# Patient Record
Sex: MALE | Race: WHITE | NOT HISPANIC OR LATINO | Employment: OTHER | URBAN - METROPOLITAN AREA
[De-identification: names, ages, dates, MRNs, and addresses within clinical notes are randomized per-mention and may not be internally consistent; named-entity substitution may affect disease eponyms.]

---

## 2020-07-24 ENCOUNTER — TRANSCRIBE ORDERS (OUTPATIENT)
Dept: ADMINISTRATIVE | Facility: HOSPITAL | Age: 65
End: 2020-07-24

## 2020-07-24 DIAGNOSIS — L03.115 CELLULITIS OF RIGHT FOOT: Primary | ICD-10-CM

## 2020-07-25 ENCOUNTER — HOSPITAL ENCOUNTER (INPATIENT)
Facility: HOSPITAL | Age: 65
LOS: 2 days | Discharge: HOME | DRG: 603 | End: 2020-07-27
Attending: EMERGENCY MEDICINE | Admitting: FAMILY MEDICINE
Payer: COMMERCIAL

## 2020-07-25 ENCOUNTER — APPOINTMENT (EMERGENCY)
Dept: MRI IMAGING | Facility: HOSPITAL | Age: 65
DRG: 603 | End: 2020-07-25
Payer: COMMERCIAL

## 2020-07-25 DIAGNOSIS — L03.115 CELLULITIS OF RIGHT FOOT: Primary | ICD-10-CM

## 2020-07-25 PROBLEM — N17.9 AKI (ACUTE KIDNEY INJURY) (HCC): Status: ACTIVE | Noted: 2020-07-25

## 2020-07-25 LAB
ALBUMIN SERPL BCP-MCNC: 3.5 G/DL (ref 3.5–5)
ALP SERPL-CCNC: 91 U/L (ref 46–116)
ALT SERPL W P-5'-P-CCNC: 42 U/L (ref 12–78)
ANION GAP SERPL CALCULATED.3IONS-SCNC: 8 MMOL/L (ref 4–13)
AST SERPL W P-5'-P-CCNC: 20 U/L (ref 5–45)
BASOPHILS # BLD AUTO: 0.1 THOUSANDS/ΜL (ref 0–0.1)
BASOPHILS NFR BLD AUTO: 1 % (ref 0–1)
BILIRUB SERPL-MCNC: 0.35 MG/DL (ref 0.2–1)
BUN SERPL-MCNC: 17 MG/DL (ref 5–25)
CALCIUM SERPL-MCNC: 8.6 MG/DL (ref 8.3–10.1)
CHLORIDE SERPL-SCNC: 104 MMOL/L (ref 100–108)
CK MB SERPL-MCNC: 2.1 % (ref 0–2.5)
CK MB SERPL-MCNC: 3.6 NG/ML (ref 0–5)
CK SERPL-CCNC: 170 U/L (ref 39–308)
CO2 SERPL-SCNC: 27 MMOL/L (ref 21–32)
CREAT SERPL-MCNC: 1.39 MG/DL (ref 0.6–1.3)
EOSINOPHIL # BLD AUTO: 0.26 THOUSAND/ΜL (ref 0–0.61)
EOSINOPHIL NFR BLD AUTO: 3 % (ref 0–6)
ERYTHROCYTE [DISTWIDTH] IN BLOOD BY AUTOMATED COUNT: 12.8 % (ref 11.6–15.1)
GFR SERPL CREATININE-BSD FRML MDRD: 52 ML/MIN/1.73SQ M
GLUCOSE SERPL-MCNC: 145 MG/DL (ref 65–140)
HCT VFR BLD AUTO: 48.4 % (ref 36.5–49.3)
HGB BLD-MCNC: 16.1 G/DL (ref 12–17)
IMM GRANULOCYTES # BLD AUTO: 0.02 THOUSAND/UL (ref 0–0.2)
IMM GRANULOCYTES NFR BLD AUTO: 0 % (ref 0–2)
LACTATE SERPL-SCNC: 1.3 MMOL/L (ref 0.5–2)
LYMPHOCYTES # BLD AUTO: 1.09 THOUSANDS/ΜL (ref 0.6–4.47)
LYMPHOCYTES NFR BLD AUTO: 14 % (ref 14–44)
MCH RBC QN AUTO: 30 PG (ref 26.8–34.3)
MCHC RBC AUTO-ENTMCNC: 33.3 G/DL (ref 31.4–37.4)
MCV RBC AUTO: 90 FL (ref 82–98)
MONOCYTES # BLD AUTO: 0.47 THOUSAND/ΜL (ref 0.17–1.22)
MONOCYTES NFR BLD AUTO: 6 % (ref 4–12)
NEUTROPHILS # BLD AUTO: 5.91 THOUSANDS/ΜL (ref 1.85–7.62)
NEUTS SEG NFR BLD AUTO: 76 % (ref 43–75)
NRBC BLD AUTO-RTO: 0 /100 WBCS
PLATELET # BLD AUTO: 248 THOUSANDS/UL (ref 149–390)
PMV BLD AUTO: 9.5 FL (ref 8.9–12.7)
POTASSIUM SERPL-SCNC: 3.8 MMOL/L (ref 3.5–5.3)
PROT SERPL-MCNC: 6.7 G/DL (ref 6.4–8.2)
RBC # BLD AUTO: 5.37 MILLION/UL (ref 3.88–5.62)
SODIUM SERPL-SCNC: 139 MMOL/L (ref 136–145)
WBC # BLD AUTO: 7.85 THOUSAND/UL (ref 4.31–10.16)

## 2020-07-25 PROCEDURE — 86617 LYME DISEASE ANTIBODY: CPT | Performed by: EMERGENCY MEDICINE

## 2020-07-25 PROCEDURE — 73720 MRI LWR EXTREMITY W/O&W/DYE: CPT

## 2020-07-25 PROCEDURE — 36415 COLL VENOUS BLD VENIPUNCTURE: CPT | Performed by: EMERGENCY MEDICINE

## 2020-07-25 PROCEDURE — 83605 ASSAY OF LACTIC ACID: CPT | Performed by: EMERGENCY MEDICINE

## 2020-07-25 PROCEDURE — 85025 COMPLETE CBC W/AUTO DIFF WBC: CPT | Performed by: EMERGENCY MEDICINE

## 2020-07-25 PROCEDURE — 99285 EMERGENCY DEPT VISIT HI MDM: CPT | Performed by: EMERGENCY MEDICINE

## 2020-07-25 PROCEDURE — 82553 CREATINE MB FRACTION: CPT | Performed by: FAMILY MEDICINE

## 2020-07-25 PROCEDURE — 86618 LYME DISEASE ANTIBODY: CPT | Performed by: EMERGENCY MEDICINE

## 2020-07-25 PROCEDURE — A9585 GADOBUTROL INJECTION: HCPCS | Performed by: EMERGENCY MEDICINE

## 2020-07-25 PROCEDURE — 82550 ASSAY OF CK (CPK): CPT | Performed by: FAMILY MEDICINE

## 2020-07-25 PROCEDURE — 87040 BLOOD CULTURE FOR BACTERIA: CPT | Performed by: EMERGENCY MEDICINE

## 2020-07-25 PROCEDURE — 99223 1ST HOSP IP/OBS HIGH 75: CPT | Performed by: FAMILY MEDICINE

## 2020-07-25 PROCEDURE — 99285 EMERGENCY DEPT VISIT HI MDM: CPT

## 2020-07-25 PROCEDURE — 80053 COMPREHEN METABOLIC PANEL: CPT | Performed by: EMERGENCY MEDICINE

## 2020-07-25 PROCEDURE — 96360 HYDRATION IV INFUSION INIT: CPT

## 2020-07-25 RX ORDER — ACETAMINOPHEN 325 MG/1
650 TABLET ORAL EVERY 6 HOURS PRN
Status: DISCONTINUED | OUTPATIENT
Start: 2020-07-25 | End: 2020-07-27 | Stop reason: HOSPADM

## 2020-07-25 RX ORDER — SODIUM CHLORIDE 9 MG/ML
75 INJECTION, SOLUTION INTRAVENOUS CONTINUOUS
Status: DISCONTINUED | OUTPATIENT
Start: 2020-07-25 | End: 2020-07-27 | Stop reason: HOSPADM

## 2020-07-25 RX ORDER — ONDANSETRON 2 MG/ML
4 INJECTION INTRAMUSCULAR; INTRAVENOUS EVERY 6 HOURS PRN
Status: DISCONTINUED | OUTPATIENT
Start: 2020-07-25 | End: 2020-07-27 | Stop reason: HOSPADM

## 2020-07-25 RX ORDER — CEPHALEXIN 500 MG/1
500 CAPSULE ORAL EVERY 6 HOURS SCHEDULED
COMMUNITY
End: 2020-07-25

## 2020-07-25 RX ORDER — PRAVASTATIN SODIUM 40 MG
40 TABLET ORAL
Status: DISCONTINUED | OUTPATIENT
Start: 2020-07-25 | End: 2020-07-27 | Stop reason: HOSPADM

## 2020-07-25 RX ORDER — OXYCODONE HYDROCHLORIDE AND ACETAMINOPHEN 5; 325 MG/1; MG/1
1 TABLET ORAL EVERY 6 HOURS PRN
Status: DISCONTINUED | OUTPATIENT
Start: 2020-07-25 | End: 2020-07-27 | Stop reason: HOSPADM

## 2020-07-25 RX ORDER — SIMVASTATIN 40 MG
40 TABLET ORAL DAILY
COMMUNITY
Start: 2010-07-12

## 2020-07-25 RX ORDER — SULFAMETHOXAZOLE AND TRIMETHOPRIM 800; 160 MG/1; MG/1
1 TABLET ORAL EVERY 12 HOURS SCHEDULED
COMMUNITY
End: 2020-07-25

## 2020-07-25 RX ORDER — CEFTRIAXONE 1 G/50ML
1000 INJECTION, SOLUTION INTRAVENOUS EVERY 24 HOURS
Status: DISCONTINUED | OUTPATIENT
Start: 2020-07-26 | End: 2020-07-26

## 2020-07-25 RX ORDER — MAGNESIUM HYDROXIDE/ALUMINUM HYDROXICE/SIMETHICONE 120; 1200; 1200 MG/30ML; MG/30ML; MG/30ML
30 SUSPENSION ORAL EVERY 6 HOURS PRN
Status: DISCONTINUED | OUTPATIENT
Start: 2020-07-25 | End: 2020-07-27 | Stop reason: HOSPADM

## 2020-07-25 RX ORDER — VANCOMYCIN HYDROCHLORIDE 1 G/200ML
12.5 INJECTION, SOLUTION INTRAVENOUS EVERY 12 HOURS
Status: DISCONTINUED | OUTPATIENT
Start: 2020-07-26 | End: 2020-07-26 | Stop reason: DRUGHIGH

## 2020-07-25 RX ORDER — DOCUSATE SODIUM 100 MG/1
100 CAPSULE, LIQUID FILLED ORAL 2 TIMES DAILY
Status: DISCONTINUED | OUTPATIENT
Start: 2020-07-25 | End: 2020-07-27 | Stop reason: HOSPADM

## 2020-07-25 RX ORDER — CEFTRIAXONE 1 G/50ML
1000 INJECTION, SOLUTION INTRAVENOUS ONCE
Status: COMPLETED | OUTPATIENT
Start: 2020-07-25 | End: 2020-07-25

## 2020-07-25 RX ADMIN — SODIUM CHLORIDE 75 ML/HR: 0.9 INJECTION, SOLUTION INTRAVENOUS at 14:00

## 2020-07-25 RX ADMIN — PRAVASTATIN SODIUM 40 MG: 40 TABLET ORAL at 16:11

## 2020-07-25 RX ADMIN — VANCOMYCIN HYDROCHLORIDE 1500 MG: 1 INJECTION, POWDER, LYOPHILIZED, FOR SOLUTION INTRAVENOUS at 12:54

## 2020-07-25 RX ADMIN — ENOXAPARIN SODIUM 40 MG: 40 INJECTION SUBCUTANEOUS at 13:59

## 2020-07-25 RX ADMIN — GADOBUTROL 10 ML: 604.72 INJECTION INTRAVENOUS at 11:26

## 2020-07-25 RX ADMIN — CEFTRIAXONE 1000 MG: 1 INJECTION, SOLUTION INTRAVENOUS at 12:38

## 2020-07-25 RX ADMIN — SODIUM CHLORIDE 1000 ML: 0.9 INJECTION, SOLUTION INTRAVENOUS at 10:22

## 2020-07-25 NOTE — ED PROVIDER NOTES
History  Chief Complaint   Patient presents with    Leg Pain     pt with red and warm foot that travels to the knee  right leg Pt is scheduled for MRI today at 1000  Patient has been having redness and swelling to the right foot for the past 3 weeks  Has been on 3 different antibiotics including doxycycline and Bactrim and now currently Keflex  Scheduled for an MRI today  Complains of increasing redness and swelling  No fevers or chills  No nausea vomiting or diarrhea  Now with redness to the knee and under the right chest region  History provided by:  Patient   used: No    Medical Problem   Location:  Right foot swelling  Quality:  Achy  Severity:  Mild  Onset quality:  Gradual  Duration:  3 weeks  Timing:  Constant  Progression:  Worsening  Chronicity:  New  Context:  Right foot swelling  Relieved by:  Nothing  Worsened by:  Nothing  Ineffective treatments:  Antibiotics  Associated symptoms: myalgias    Associated symptoms: no abdominal pain, no chest pain, no congestion, no cough, no diarrhea, no ear pain, no fever, no headaches, no nausea, no rash, no rhinorrhea, no shortness of breath, no sore throat, no vomiting and no wheezing        Prior to Admission Medications   Prescriptions Last Dose Informant Patient Reported? Taking?   simvastatin (ZOCOR) 40 mg tablet 7/24/2020 at Unknown time  Yes Yes   Sig: Take 40 mg by mouth daily      Facility-Administered Medications: None       Past Medical History:   Diagnosis Date    High cholesterol     Leg pain        History reviewed  No pertinent surgical history  No family history on file  I have reviewed and agree with the history as documented  E-Cigarette/Vaping     E-Cigarette/Vaping Substances     Social History     Tobacco Use    Smoking status: Former Smoker   Substance Use Topics    Alcohol use: Not Currently    Drug use: Not on file       Review of Systems   Constitutional: Negative for chills and fever     HENT: Negative for congestion, ear pain, hearing loss, rhinorrhea, sore throat, trouble swallowing and voice change  Eyes: Negative for pain and discharge  Respiratory: Negative for cough, shortness of breath and wheezing  Cardiovascular: Negative for chest pain and palpitations  Gastrointestinal: Negative for abdominal pain, blood in stool, constipation, diarrhea, nausea and vomiting  Genitourinary: Negative for dysuria, flank pain, frequency and hematuria  Musculoskeletal: Positive for arthralgias and myalgias  Negative for joint swelling, neck pain and neck stiffness  Skin: Negative for rash and wound  Neurological: Negative for dizziness, seizures, syncope, facial asymmetry and headaches  Psychiatric/Behavioral: Negative for hallucinations, self-injury and suicidal ideas  All other systems reviewed and are negative  Physical Exam  Physical Exam   Constitutional: He is oriented to person, place, and time  He appears well-developed and well-nourished  No distress  HENT:   Head: Normocephalic and atraumatic  Right Ear: External ear normal    Left Ear: External ear normal    Eyes: Pupils are equal, round, and reactive to light  Conjunctivae and EOM are normal    Neck: Normal range of motion  Neck supple  Cardiovascular: Normal rate, regular rhythm and normal heart sounds  No murmur heard  Pulmonary/Chest: Effort normal and breath sounds normal  He has no wheezes  He has no rales  Abdominal: Soft  Bowel sounds are normal  He exhibits no distension  There is no tenderness  There is no rebound and no guarding  Musculoskeletal: Normal range of motion  He exhibits no deformity  Right foot with diffuse erythema and warmth  No definite fluctuance  Dorsalis pedis pulses 2+  There is redness just above the right knee and under the right chest region  Neurological: He is alert and oriented to person, place, and time  No cranial nerve deficit  Skin: Skin is warm and dry   No rash noted    Psychiatric: He has a normal mood and affect  His behavior is normal    Nursing note and vitals reviewed        Vital Signs  ED Triage Vitals [07/25/20 0945]   Temperature Pulse Respirations Blood Pressure SpO2   (!) 97 4 °F (36 3 °C) 80 16 159/84 95 %      Temp Source Heart Rate Source Patient Position - Orthostatic VS BP Location FiO2 (%)   Temporal Monitor -- Left arm --      Pain Score       7           Vitals:    07/25/20 1156 07/25/20 1200 07/25/20 1230 07/25/20 1311   BP: 135/64 118/60 124/70 148/75   Pulse: 64 66 60 62         Visual Acuity      ED Medications  Medications   vancomycin (VANCOCIN) 1,500 mg in sodium chloride 0 9 % 250 mL IVPB (1,500 mg Intravenous New Bag 7/25/20 1254)   cefTRIAXone (ROCEPHIN) IVPB (premix) 1,000 mg 50 mL (has no administration in time range)   vancomycin (VANCOCIN) 1,500 mg in sodium chloride 0 9 % 250 mL IVPB (has no administration in time range)   sodium chloride 0 9 % bolus 1,000 mL (0 mL Intravenous Stopped 7/25/20 1127)   Gadobutrol injection (SINGLE-DOSE) SOLN 10 mL (10 mL Intravenous Given 7/25/20 1126)   cefTRIAXone (ROCEPHIN) IVPB (premix) 1,000 mg 50 mL (0 mg Intravenous Stopped 7/25/20 1254)       Diagnostic Studies  Results Reviewed     Procedure Component Value Units Date/Time    Comprehensive metabolic panel [[id number]]  (Abnormal) Collected:  07/25/20 1019    Lab Status:  Final result Specimen:  Blood from Arm, Right Updated:  07/25/20 1101     Sodium 139 mmol/L      Potassium 3 8 mmol/L      Chloride 104 mmol/L      CO2 27 mmol/L      ANION GAP 8 mmol/L      BUN 17 mg/dL      Creatinine 1 39 mg/dL      Glucose 145 mg/dL      Calcium 8 6 mg/dL      AST 20 U/L      ALT 42 U/L      Alkaline Phosphatase 91 U/L      Total Protein 6 7 g/dL      Albumin 3 5 g/dL      Total Bilirubin 0 35 mg/dL      eGFR 52 ml/min/1 73sq m     Narrative:       [place] guidelines for Chronic Kidney Disease (CKD):     Stage 1 with normal or high GFR (GFR > 90 mL/min/1 73 square meters)    Stage 2 Mild CKD (GFR = 60-89 mL/min/1 73 square meters)    Stage 3A Moderate CKD (GFR = 45-59 mL/min/1 73 square meters)    Stage 3B Moderate CKD (GFR = 30-44 mL/min/1 73 square meters)    Stage 4 Severe CKD (GFR = 15-29 mL/min/1 73 square meters)    Stage 5 End Stage CKD (GFR <15 mL/min/1 73 square meters)  Note: GFR calculation is accurate only with a steady state creatinine    Lactic acid [[id number]]  (Normal) Collected:  07/25/20 1019    Lab Status:  Final result Specimen:  Blood from Arm, Right Updated:  07/25/20 1054     LACTIC ACID 1 3 mmol/L     Narrative:       Result may be elevated if tourniquet was used during collection  CBC and differential [[id number]]  (Abnormal) Collected:  07/25/20 1019    Lab Status:  Final result Specimen:  Blood from Arm, Right Updated:  07/25/20 1030     WBC 7 85 Thousand/uL      RBC 5 37 Million/uL      Hemoglobin 16 1 g/dL      Hematocrit 48 4 %      MCV 90 fL      MCH 30 0 pg      MCHC 33 3 g/dL      RDW 12 8 %      MPV 9 5 fL      Platelets 489 Thousands/uL      nRBC 0 /100 WBCs      Neutrophils Relative 76 %      Immat GRANS % 0 %      Lymphocytes Relative 14 %      Monocytes Relative 6 %      Eosinophils Relative 3 %      Basophils Relative 1 %      Neutrophils Absolute 5 91 Thousands/µL      Immature Grans Absolute 0 02 Thousand/uL      Lymphocytes Absolute 1 09 Thousands/µL      Monocytes Absolute 0 47 Thousand/µL      Eosinophils Absolute 0 26 Thousand/µL      Basophils Absolute 0 10 Thousands/µL     Blood culture #2 [[id number]] Collected:  07/25/20 1019    Lab Status: In process Specimen:  Blood from Arm, Left Updated:  07/25/20 1030    Blood culture #1 [[id number]] Collected:  07/25/20 1019    Lab Status: In process Specimen:  Blood from Arm, Right Updated:  07/25/20 1030    Lyme Antibody Profile with reflex to [facility] [[id number]] Collected:  07/25/20 1019    Lab Status:   In process Specimen:  Blood from Arm, Right Updated: 07/25/20 1028                 MRI foot/forefoot toes right w wo contrast   Final Result by Yu García MD (07/25 1311)      Areas of dorsal and forefoot subcutaneous edema without an abscess or osteomyelitis  Mild edema of the distal tip of the first distal phalanx with an underlying area of linear signal abnormality  Please assess for tenderness to exclude an underlying nondisplaced fracture  #7/14 and #6/14  Differential diagnosis also includes mild    reactive edema, to the overlying cellulitis , overlying a chronic healed injury  The study was marked in [facility] for immediate notification  Workstation performed: [id number]                    Procedures  Procedures         ED Course       US AUDIT      Most Recent Value   Initial Alcohol Screen: US AUDIT-C    1  How often do you have a drink containing alcohol?  0 Filed at: 07/25/2020 0948   2  How many drinks containing alcohol do you have on a typical day you are drinking? 0 Filed at: 07/25/2020 0948   3a  Male UNDER 65: How often do you have five or more drinks on one occasion? 0 Filed at: 07/25/2020 0948   3b  FEMALE Any Age, or MALE 65+: How often do you have 4 or more drinks on one occassion? 0 Filed at: 07/25/2020 0948   Audit-C Score  0 Filed at: 07/25/2020 5697                  PRINCE/DAST-10      Most Recent Value   How many times in the past year have you    Used an illegal drug or used a prescription medication for non-medical reasons?   Never Filed at: 07/25/2020 6244                                MDM  Number of Diagnoses or Management Options     Amount and/or Complexity of Data Reviewed  Clinical lab tests: reviewed                          Disposition  Final diagnoses:   Cellulitis of right foot     Time reflects when diagnosis was documented in both MDM as applicable and the Disposition within this note     Time User Action Codes Description Comment    7/25/2020 12:12 PM Sanjeev MORRIS Add [L03 115] Cellulitis of right foot       ED Disposition     ED Disposition Condition Date/Time Comment    Admit Stable Sat Jul 25, 2020 12:29 PM Case was discussed with Dr Joseph Wellington and the patient's admission status was agreed to be  to the service of Dr Rosalinda Boateng         Follow-up Information    None         Current Discharge Medication List      CONTINUE these medications which have NOT CHANGED    Details   simvastatin (ZOCOR) 40 mg tablet Take 40 mg by mouth daily           No discharge procedures on file      PDMP Review       Value Time User    PDMP Reviewed  Yes 7/25/2020 12:39 PM Estella Meyers MD          ED Provider  Electronically Signed by           Joie Levine MD  07/25/20 102 [facility] Elizabeth Mayes MD  07/25/20 102 [facility] Elizabeth Mayes MD  07/25/20 7207

## 2020-07-25 NOTE — PLAN OF CARE
Problem: PAIN - ADULT  Goal: Verbalizes/displays adequate comfort level or baseline comfort level  Description  Interventions:  - Encourage patient to monitor pain and request assistance  - Assess pain using appropriate pain scale  - Administer analgesics based on type and severity of pain and evaluate response  - Implement non-pharmacological measures as appropriate and evaluate response  - Consider cultural and social influences on pain and pain management  - Notify physician/advanced practitioner if interventions unsuccessful or patient reports new pain  Outcome: Progressing     Problem: INFECTION - ADULT  Goal: Absence or prevention of progression during hospitalization  Description  INTERVENTIONS:  - Assess and monitor for signs and symptoms of infection  - Monitor lab/diagnostic results  - Monitor all insertion sites, i e  indwelling lines, tubes, and drains  - Monitor endotracheal if appropriate and nasal secretions for changes in amount and color  - [place] appropriate cooling/warming therapies per order  - Administer medications as ordered  - Instruct and encourage patient and family to use good hand hygiene technique  - Identify and instruct in appropriate isolation precautions for identified infection/condition  Outcome: Progressing  Goal: Absence of fever/infection during neutropenic period  Description  INTERVENTIONS:  - Monitor WBC    Outcome: Progressing     Problem: SAFETY ADULT  Goal: Patient will remain free of falls  Description  INTERVENTIONS:  - Assess patient frequently for physical needs  -  Identify cognitive and physical deficits and behaviors that affect risk of falls    -  [place] fall precautions as indicated by assessment   - Educate patient/family on patient safety including physical limitations  - Instruct patient to call for assistance with activity based on assessment  - Modify environment to reduce risk of injury  - Consider OT/PT consult to assist with strengthening/mobility  Outcome: Progressing  Goal: Maintain or return to baseline ADL function  Description  INTERVENTIONS:  -  Assess patient's ability to carry out ADLs; assess patient's baseline for ADL function and identify physical deficits which impact ability to perform ADLs (bathing, care of mouth/teeth, toileting, grooming, dressing, etc )  - Assess/evaluate cause of self-care deficits   - Assess range of motion  - Assess patient's mobility; develop plan if impaired  - Assess patient's need for assistive devices and provide as appropriate  - Encourage maximum independence but intervene and supervise when necessary  - Involve family in performance of ADLs  - Assess for home care needs following discharge   - Consider OT consult to assist with ADL evaluation and planning for discharge  - Provide patient education as appropriate  Outcome: Progressing  Goal: Maintain or return mobility status to optimal level  Description  INTERVENTIONS:  - Assess patient's baseline mobility status (ambulation, transfers, stairs, etc )    - Identify cognitive and physical deficits and behaviors that affect mobility  - Identify mobility aids required to assist with transfers and/or ambulation (gait belt, sit-to-stand, lift, walker, cane, etc )  - [place] fall precautions as indicated by assessment  - Record patient progress and toleration of activity level on Mobility SBAR; progress patient to next Phase/Stage  - Instruct patient to call for assistance with activity based on assessment  - Consider rehabilitation consult to assist with strengthening/weightbearing, etc   Outcome: Progressing     Problem: DISCHARGE PLANNING  Goal: Discharge to home or other facility with appropriate resources  Description  INTERVENTIONS:  - Identify barriers to discharge w/patient and caregiver  - Arrange for needed discharge resources and transportation as appropriate  - Identify discharge learning needs (meds, wound care, etc )  - Arrange for interpretive services to assist at discharge as needed  - Refer to Case Management Department for coordinating discharge planning if the patient needs post-hospital services based on physician/advanced practitioner order or complex needs related to functional status, cognitive ability, or social support system  Outcome: Progressing     Problem: Knowledge Deficit  Goal: Patient/family/caregiver demonstrates understanding of disease process, treatment plan, medications, and discharge instructions  Description  Complete learning assessment and assess knowledge base    Interventions:  - Provide teaching at level of understanding  - Provide teaching via preferred learning methods  Outcome: Progressing

## 2020-07-25 NOTE — PROGRESS NOTES
Vancomycin Assessment    Mario Simeon is a 77 y o  male who is currently receiving vancomycin 1500 mg IV every 12 hours for skin-soft tissue infection     Relevant clinical data and objective history reviewed:  Creatinine   Date Value Ref Range Status   07/25/2020 1 39 (H) 0 60 - 1 30 mg/dL Final     Comment:     Standardized to IDMS reference method     /75   Pulse 62   Temp 98 2 °F (36 8 °C)   Resp 18   Ht 5' 10" (1 778 m)   Wt 103 kg (226 lb)   SpO2 100%   BMI 32 43 kg/m²   No intake/output data recorded  Lab Results   Component Value Date/Time    BUN 17 07/25/2020 10:19 AM    WBC 7 85 07/25/2020 10:19 AM    HGB 16 1 07/25/2020 10:19 AM    HCT 48 4 07/25/2020 10:19 AM    MCV 90 07/25/2020 10:19 AM     07/25/2020 10:19 AM     Temp Readings from Last 3 Encounters:   07/25/20 98 2 °F (36 8 °C)     Vancomycin Days of Therapy: 1    Assessment/Plan  The patient is currently on vancomycin utilizing scheduled dosing based on adjusted body weight (due to obesity)  The patient is currently receiving 1500 mg IV every 12 hours and after clinical evaluation will be changed to 1000 mg IV every 12 hours  Pharmacy will also follow closely for s/sx of nephrotoxicity, infusion reactions and appropriateness of therapy  BMP and CBC will be ordered per protocol  Plan for trough as patient approaches steady state, prior to the 5th  dose at approximately 1130 on 7/27  Due to infection severity, will target a trough of 15-20 (appropriate for most indications)   Pharmacy will continue to follow the patients culture results and clinical progress daily      Todd Bassett, Pharmacist

## 2020-07-25 NOTE — H&P
H&P- Manolo Boateng 1955, 77 y o  male MRN: [id number]    Unit/Bed#: -01 Encounter: [id number]    Primary Care Provider: James Thompson MD   Date and time admitted to hospital: 7/25/2020  9:40 AM        * Cellulitis of right foot  Assessment & Plan  Outpatient treatment failure  Recently evaluated by surgical team  Hemodynamically stable  No signs or symptoms of sirs or sepsis  Patient received IV ceftriaxone and vancomycin in the ER-will continue  Lactic acid is in normal range  Patient also received IV fluid  Continue IV antibiotic  Continue to monitor  Follow , blood culture  MRI:Areas of dorsal and forefoot subcutaneous edema without an abscess or osteomyelitis  Mild edema of the distal tip of the first distal phalanx with an underlying area of linear signal abnormality  Please assess for tenderness to exclude an underlying nondisplaced fracture  #7/14 and #6/14  Differential diagnosis also includes mild   reactive edema, to the overlying cellulitis , overlying a chronic healed injury  LATA (acute kidney injury) ([facility] [address] )  Assessment & Plan  Baseline creatinine is 1 00  Patient recent creatinine is 1 39  Patient received IV fluid  Monitor labs  Continue gentle hydration        VTE Prophylaxis: Enoxaparin (Lovenox)  / sequential compression device   Code Status:  Full code  POLST: POLST is not applicable to this patient  Discussion with family:  Yes wife on the bedside    Anticipated Length of Stay:  Patient will be admitted on an Inpatient basis with an anticipated length of stay of  > 2 midnights  Justification for Hospital Stay:  For above condition    Total Time for Visit, including Counseling / Coordination of Care: 30 minutes  Greater than 50% of this total time spent on direct patient counseling and coordination of care      Chief Complaint:   Pain and swelling of the foot    History of Present Illness:    Manolo Boateng is a 77 y o  male who presents with pain and swelling of the foot, since going on July 4th started suddenly  Patient already seen by and treated with antibiotic twice outpatient basis  Patient reports the redness and swelling is getting worse, and spreading  Patient also reports other than right foot, he also noticed the rash on the right knee, inner aspect the left thigh, right lower extremity close to calf muscle and right upper abdomen  Denies any fever at this time but initially he was having fever  Denies any known insect bite  Any allergic reaction  Denies any diarrhea, joint swelling muscle       Review of Systems:    Review of Systems   Constitutional: Positive for activity change  Negative for appetite change, chills, diaphoresis, fatigue, fever and unexpected weight change  HENT: Negative for congestion, dental problem and drooling  Eyes: Negative for photophobia, redness and visual disturbance  Respiratory: Negative for apnea, choking, chest tightness, shortness of breath and stridor  Cardiovascular: Negative for chest pain, palpitations and leg swelling  Gastrointestinal: Negative for abdominal distention, abdominal pain, anal bleeding, blood in stool, constipation, diarrhea and nausea  Genitourinary: Negative for difficulty urinating, dysuria, enuresis and flank pain  Musculoskeletal: Positive for joint swelling  Negative for arthralgias, back pain and neck stiffness  Skin: Positive for color change, rash and wound  Neurological: Negative for dizziness, seizures, facial asymmetry, light-headedness, numbness and headaches  Hematological: Negative for adenopathy  Psychiatric/Behavioral: Negative for agitation, behavioral problems and confusion  All other systems reviewed and are negative  Past Medical and Surgical History:     Past Medical History:   Diagnosis Date    High cholesterol     Leg pain        History reviewed  No pertinent surgical history      Meds/Allergies:    Prior to Admission medications    Medication Sig Start Date End Date Taking? Authorizing Provider   cephalexin (KEFLEX) 500 mg capsule Take 500 mg by mouth every 6 (six) hours   Yes Historical Provider, MD   simvastatin (ZOCOR) 40 mg tablet Take 40 mg by mouth daily 7/12/10  Yes Historical Provider, MD   sulfamethoxazole-trimethoprim (BACTRIM DS) 800-160 mg per tablet Take 1 tablet by mouth every 12 (twelve) hours   Yes Historical Provider, MD     I have reviewed home medications with patient personally  Allergies: No Known Allergies    Social History:     Marital Status: /Civil Union   Occupation:  Unknown  Patient Pre-hospital Living Situation:  At home  Patient Pre-hospital Level of Mobility:  Independent  Patient Pre-hospital Diet Restrictions:  No restriction  Substance Use History:   Social History     Substance and Sexual Activity   Alcohol Use Not Currently     Social History     Tobacco Use   Smoking Status Former Smoker     Social History     Substance and Sexual Activity   Drug Use Not on file       Family History:    non-contributory    Physical Exam:     Vitals:   Blood Pressure: 148/75 (07/25/20 1311)  Pulse: 62 (07/25/20 1311)  Temperature: 98 2 °F (36 8 °C) (07/25/20 1311)  Temp Source: Temporal (07/25/20 0945)  Respirations: 18 (07/25/20 1156)  Height: 5' 10" (177 8 cm) (07/25/20 0945)  Weight - Scale: 103 kg (226 lb) (07/25/20 1127)  SpO2: 100 % (07/25/20 1311)    Physical Exam   Constitutional: He is oriented to person, place, and time  He appears well-nourished  No distress  HENT:   Mouth/Throat: Oropharynx is clear and moist  No oropharyngeal exudate  Eyes: Pupils are equal, round, and reactive to light  Conjunctivae and EOM are normal  No scleral icterus  Neck: Normal range of motion  Neck supple  No JVD present  Cardiovascular: Normal rate  Exam reveals no gallop and no friction rub  No murmur heard  Pulmonary/Chest: Effort normal and breath sounds normal  No stridor  No respiratory distress  He has no wheezes  Abdominal: Soft  Bowel sounds are normal  He exhibits no distension  There is no tenderness  There is no guarding  Musculoskeletal: Normal range of motion  He exhibits edema (right foot)  Arms:       Legs:  Neurological: He is alert and oriented to person, place, and time  He displays normal reflexes  No cranial nerve deficit  He exhibits normal muscle tone  Skin: Skin is warm  Capillary refill takes less than 2 seconds  Nursing note and vitals reviewed  Additional Data:     Lab Results: I have personally reviewed pertinent reports  Results from last 7 days   Lab Units 07/25/20  1019   WBC Thousand/uL 7 85   HEMOGLOBIN g/dL 16 1   HEMATOCRIT % 48 4   PLATELETS Thousands/uL 248   NEUTROS PCT % 76*   LYMPHS PCT % 14   MONOS PCT % 6   EOS PCT % 3     Results from last 7 days   Lab Units 07/25/20  1019   SODIUM mmol/L 139   POTASSIUM mmol/L 3 8   CHLORIDE mmol/L 104   CO2 mmol/L 27   BUN mg/dL 17   CREATININE mg/dL 1 39*   ANION GAP mmol/L 8   CALCIUM mg/dL 8 6   ALBUMIN g/dL 3 5   TOTAL BILIRUBIN mg/dL 0 35   ALK PHOS U/L 91   ALT U/L 42   AST U/L 20   GLUCOSE RANDOM mg/dL 145*                 Results from last 7 days   Lab Units 07/25/20  1019   LACTIC ACID mmol/L 1 3       Imaging: I have personally reviewed pertinent reports  MRI foot/forefoot toes right w wo contrast   Final Result by Izabella Nice MD (07/25 1311)      Areas of dorsal and forefoot subcutaneous edema without an abscess or osteomyelitis  Mild edema of the distal tip of the first distal phalanx with an underlying area of linear signal abnormality  Please assess for tenderness to exclude an underlying nondisplaced fracture  #7/14 and #6/14  Differential diagnosis also includes mild    reactive edema, to the overlying cellulitis , overlying a chronic healed injury  The study was marked in [facility] for immediate notification           Workstation performed: [id number]             EKG, Pathology, and Other Studies Reviewed on Admission:   · EKG: none    Allscripts / Epic Records Reviewed: Yes     ** Please Note: This note has been constructed using a voice recognition system   **

## 2020-07-25 NOTE — ED NOTES
Pt returns from MRI  Pt resting in position of comfort, no distress noted at this time  Awaiting MRI results        Shawnee Segundo RN  07/25/20 0617

## 2020-07-25 NOTE — ASSESSMENT & PLAN NOTE
Baseline creatinine is 1 00  Patient recent creatinine is 1 39  Patient received IV fluid  Monitor labs  Continue gentle hydration

## 2020-07-25 NOTE — ASSESSMENT & PLAN NOTE
Outpatient treatment failure  Recently evaluated by surgical team  Hemodynamically stable  No signs or symptoms of sirs or sepsis  Patient received IV ceftriaxone and vancomycin in the ER-will continue  Lactic acid is in normal range  Patient also received IV fluid  Continue IV antibiotic  Continue to monitor  Follow , blood culture  MRI:Areas of dorsal and forefoot subcutaneous edema without an abscess or osteomyelitis  Mild edema of the distal tip of the first distal phalanx with an underlying area of linear signal abnormality  Please assess for tenderness to exclude an underlying nondisplaced fracture  #7/14 and #6/14  Differential diagnosis also includes mild   reactive edema, to the overlying cellulitis , overlying a chronic healed injury

## 2020-07-26 LAB
ALBUMIN SERPL BCP-MCNC: 2.9 G/DL (ref 3.5–5)
ALP SERPL-CCNC: 75 U/L (ref 46–116)
ALT SERPL W P-5'-P-CCNC: 35 U/L (ref 12–78)
ANION GAP SERPL CALCULATED.3IONS-SCNC: 6 MMOL/L (ref 4–13)
AST SERPL W P-5'-P-CCNC: 15 U/L (ref 5–45)
BASOPHILS # BLD AUTO: 0.1 THOUSANDS/ΜL (ref 0–0.1)
BASOPHILS NFR BLD AUTO: 2 % (ref 0–1)
BILIRUB SERPL-MCNC: 0.41 MG/DL (ref 0.2–1)
BUN SERPL-MCNC: 15 MG/DL (ref 5–25)
CALCIUM SERPL-MCNC: 8.2 MG/DL (ref 8.3–10.1)
CHLORIDE SERPL-SCNC: 107 MMOL/L (ref 100–108)
CO2 SERPL-SCNC: 27 MMOL/L (ref 21–32)
CREAT SERPL-MCNC: 1.07 MG/DL (ref 0.6–1.3)
EOSINOPHIL # BLD AUTO: 0.38 THOUSAND/ΜL (ref 0–0.61)
EOSINOPHIL NFR BLD AUTO: 6 % (ref 0–6)
ERYTHROCYTE [DISTWIDTH] IN BLOOD BY AUTOMATED COUNT: 13 % (ref 11.6–15.1)
GFR SERPL CREATININE-BSD FRML MDRD: 71 ML/MIN/1.73SQ M
GLUCOSE SERPL-MCNC: 102 MG/DL (ref 65–140)
HCT VFR BLD AUTO: 44.7 % (ref 36.5–49.3)
HGB BLD-MCNC: 14.6 G/DL (ref 12–17)
IMM GRANULOCYTES # BLD AUTO: 0.01 THOUSAND/UL (ref 0–0.2)
IMM GRANULOCYTES NFR BLD AUTO: 0 % (ref 0–2)
LYMPHOCYTES # BLD AUTO: 1.67 THOUSANDS/ΜL (ref 0.6–4.47)
LYMPHOCYTES NFR BLD AUTO: 27 % (ref 14–44)
MCH RBC QN AUTO: 29.6 PG (ref 26.8–34.3)
MCHC RBC AUTO-ENTMCNC: 32.7 G/DL (ref 31.4–37.4)
MCV RBC AUTO: 91 FL (ref 82–98)
MONOCYTES # BLD AUTO: 0.62 THOUSAND/ΜL (ref 0.17–1.22)
MONOCYTES NFR BLD AUTO: 10 % (ref 4–12)
NEUTROPHILS # BLD AUTO: 3.41 THOUSANDS/ΜL (ref 1.85–7.62)
NEUTS SEG NFR BLD AUTO: 55 % (ref 43–75)
NRBC BLD AUTO-RTO: 0 /100 WBCS
PLATELET # BLD AUTO: 234 THOUSANDS/UL (ref 149–390)
PMV BLD AUTO: 9.7 FL (ref 8.9–12.7)
POTASSIUM SERPL-SCNC: 4.4 MMOL/L (ref 3.5–5.3)
PROT SERPL-MCNC: 5.8 G/DL (ref 6.4–8.2)
RBC # BLD AUTO: 4.94 MILLION/UL (ref 3.88–5.62)
SODIUM SERPL-SCNC: 140 MMOL/L (ref 136–145)
WBC # BLD AUTO: 6.19 THOUSAND/UL (ref 4.31–10.16)

## 2020-07-26 PROCEDURE — 85025 COMPLETE CBC W/AUTO DIFF WBC: CPT | Performed by: FAMILY MEDICINE

## 2020-07-26 PROCEDURE — 99232 SBSQ HOSP IP/OBS MODERATE 35: CPT | Performed by: FAMILY MEDICINE

## 2020-07-26 PROCEDURE — 80053 COMPREHEN METABOLIC PANEL: CPT | Performed by: FAMILY MEDICINE

## 2020-07-26 RX ORDER — CEFTRIAXONE 1 G/50ML
1000 INJECTION, SOLUTION INTRAVENOUS EVERY 24 HOURS
Status: DISCONTINUED | OUTPATIENT
Start: 2020-07-26 | End: 2020-07-27 | Stop reason: HOSPADM

## 2020-07-26 RX ADMIN — VANCOMYCIN HYDROCHLORIDE 1000 MG: 1 INJECTION, SOLUTION INTRAVENOUS at 00:33

## 2020-07-26 RX ADMIN — PRAVASTATIN SODIUM 40 MG: 40 TABLET ORAL at 17:00

## 2020-07-26 RX ADMIN — CEFTRIAXONE 1000 MG: 1 INJECTION, SOLUTION INTRAVENOUS at 14:37

## 2020-07-26 RX ADMIN — VANCOMYCIN HYDROCHLORIDE 1500 MG: 5 INJECTION, POWDER, LYOPHILIZED, FOR SOLUTION INTRAVENOUS at 12:06

## 2020-07-26 RX ADMIN — ENOXAPARIN SODIUM 40 MG: 40 INJECTION SUBCUTANEOUS at 09:06

## 2020-07-26 RX ADMIN — VANCOMYCIN HYDROCHLORIDE 1500 MG: 5 INJECTION, POWDER, LYOPHILIZED, FOR SOLUTION INTRAVENOUS at 23:49

## 2020-07-26 NOTE — PLAN OF CARE
Problem: PAIN - ADULT  Goal: Verbalizes/displays adequate comfort level or baseline comfort level  Description  Interventions:  - Encourage patient to monitor pain and request assistance  - Assess pain using appropriate pain scale  - Administer analgesics based on type and severity of pain and evaluate response  - Implement non-pharmacological measures as appropriate and evaluate response  - Consider cultural and social influences on pain and pain management  - Notify physician/advanced practitioner if interventions unsuccessful or patient reports new pain  Outcome: Progressing     Problem: INFECTION - ADULT  Goal: Absence or prevention of progression during hospitalization  Description  INTERVENTIONS:  - Assess and monitor for signs and symptoms of infection  - Monitor lab/diagnostic results  - Monitor all insertion sites, i e  indwelling lines, tubes, and drains  - Monitor endotracheal if appropriate and nasal secretions for changes in amount and color  - [place] appropriate cooling/warming therapies per order  - Administer medications as ordered  - Instruct and encourage patient and family to use good hand hygiene technique  - Identify and instruct in appropriate isolation precautions for identified infection/condition  Outcome: Progressing  Goal: Absence of fever/infection during neutropenic period  Description  INTERVENTIONS:  - Monitor WBC    Outcome: Progressing     Problem: SAFETY ADULT  Goal: Patient will remain free of falls  Description  INTERVENTIONS:  - Assess patient frequently for physical needs  -  Identify cognitive and physical deficits and behaviors that affect risk of falls    -  [place] fall precautions as indicated by assessment   - Educate patient/family on patient safety including physical limitations  - Instruct patient to call for assistance with activity based on assessment  - Modify environment to reduce risk of injury  - Consider OT/PT consult to assist with strengthening/mobility  Outcome: Progressing  Goal: Maintain or return to baseline ADL function  Description  INTERVENTIONS:  -  Assess patient's ability to carry out ADLs; assess patient's baseline for ADL function and identify physical deficits which impact ability to perform ADLs (bathing, care of mouth/teeth, toileting, grooming, dressing, etc )  - Assess/evaluate cause of self-care deficits   - Assess range of motion  - Assess patient's mobility; develop plan if impaired  - Assess patient's need for assistive devices and provide as appropriate  - Encourage maximum independence but intervene and supervise when necessary  - Involve family in performance of ADLs  - Assess for home care needs following discharge   - Consider OT consult to assist with ADL evaluation and planning for discharge  - Provide patient education as appropriate  Outcome: Progressing  Goal: Maintain or return mobility status to optimal level  Description  INTERVENTIONS:  - Assess patient's baseline mobility status (ambulation, transfers, stairs, etc )    - Identify cognitive and physical deficits and behaviors that affect mobility  - Identify mobility aids required to assist with transfers and/or ambulation (gait belt, sit-to-stand, lift, walker, cane, etc )  - [place] fall precautions as indicated by assessment  - Record patient progress and toleration of activity level on Mobility SBAR; progress patient to next Phase/Stage  - Instruct patient to call for assistance with activity based on assessment  - Consider rehabilitation consult to assist with strengthening/weightbearing, etc   Outcome: Progressing     Problem: DISCHARGE PLANNING  Goal: Discharge to home or other facility with appropriate resources  Description  INTERVENTIONS:  - Identify barriers to discharge w/patient and caregiver  - Arrange for needed discharge resources and transportation as appropriate  - Identify discharge learning needs (meds, wound care, etc )  - Arrange for interpretive services to assist at discharge as needed  - Refer to Case Management Department for coordinating discharge planning if the patient needs post-hospital services based on physician/advanced practitioner order or complex needs related to functional status, cognitive ability, or social support system  Outcome: Progressing     Problem: Knowledge Deficit  Goal: Patient/family/caregiver demonstrates understanding of disease process, treatment plan, medications, and discharge instructions  Description  Complete learning assessment and assess knowledge base    Interventions:  - Provide teaching at level of understanding  - Provide teaching via preferred learning methods  Outcome: Progressing

## 2020-07-26 NOTE — PROGRESS NOTES
Progress Note Ilia Orozco 1955, 77 y o  male MRN: [id number]    Unit/Bed#: -01 Encounter: [id number]    Primary Care Provider: Nita Lindsay MD   Date and time admitted to hospital: 7/25/2020  9:40 AM        * Cellulitis of right foot  Assessment & Plan  Outpatient treatment failure  Recently evaluated by surgical team  Hemodynamically stable  No signs or symptoms of sirs or sepsis  Patient received IV ceftriaxone and vancomycin in the ER-will continue  Lactic acid is in normal range  Continue IV antibiotic  Follow , blood culture  MRI:Areas of dorsal and forefoot subcutaneous edema without an abscess or osteomyelitis  Mild edema of the distal tip of the first distal phalanx with an underlying area of linear signal abnormality  Please assess for tenderness to exclude an underlying nondisplaced fracture  #7/14 and #6/14  Differential diagnosis also includes mild   reactive edema, to the overlying cellulitis , overlying a chronic healed injury  LATA (acute kidney injury) ([facility] [address] )  Assessment & Plan  Baseline creatinine is 1 00  Patient recent creatinine is 1 39  Continue gentle hydration  Condition improved      VTE Pharmacologic Prophylaxis:   Pharmacologic: Enoxaparin (Lovenox)  Mechanical VTE Prophylaxis in Place: Yes    Patient Centered Rounds: I have performed bedside rounds with nursing staff today  Education and Discussions with Family / Patient:  Yes with patient    Time Spent for Care: 30 minutes  More than 50% of total time spent on counseling and coordination of care as described above  Current Length of Stay: 1 day(s)    Current Patient Status: Inpatient   Certification Statement: The patient will continue to require additional inpatient hospital stay due to Cellulitis requiring IV antibiotic    Discharge Plan / Estimated Discharge Date: To be determined      Code Status: Level 1 - Full Code      Subjective:   Seen and evaluated during the round    Patient reports his redness is improving but is still having pain in the right foot  Denies any fever, nausea, vomiting, diarrhea  Objective:     Vitals:   Temp (24hrs), Av 1 °F (36 7 °C), Min:97 9 °F (36 6 °C), Max:98 2 °F (36 8 °C)    Temp:  [97 9 °F (36 6 °C)-98 2 °F (36 8 °C)] 97 9 °F (36 6 °C)  HR:  [54-69] 54  Resp:  [16-19] 19  BP: (112-148)/(60-78) 118/78  SpO2:  [95 %-100 %] 96 %  Body mass index is 33 21 kg/m²  Input and Output Summary (last 24 hours): Intake/Output Summary (Last 24 hours) at 2020 1054  Last data filed at 2020 3874  Gross per 24 hour   Intake 3282 5 ml   Output    Net 3282 5 ml       Physical Exam:     Physical Exam   Constitutional: He is oriented to person, place, and time  No distress  Eyes: Pupils are equal, round, and reactive to light  Conjunctivae and EOM are normal  No scleral icterus  Neck: Neck supple  No JVD present  Cardiovascular: Normal rate  No murmur heard  Pulmonary/Chest: Effort normal and breath sounds normal  No respiratory distress  Abdominal: Soft  Bowel sounds are normal  He exhibits no distension  There is no tenderness  There is no guarding  Musculoskeletal: Normal range of motion  Neurological: He is alert and oriented to person, place, and time  No cranial nerve deficit  Coordination normal    Skin: Skin is warm  Capillary refill takes less than 2 seconds  Rash are improving   Nursing note and vitals reviewed  Additional Data:     Labs:    Results from last 7 days   Lab Units 20  0527   WBC Thousand/uL 6 19   HEMOGLOBIN g/dL 14 6   HEMATOCRIT % 44 7   PLATELETS Thousands/uL 234   NEUTROS PCT % 55   LYMPHS PCT % 27   MONOS PCT % 10   EOS PCT % 6     Results from last 7 days   Lab Units 20  0527   POTASSIUM mmol/L 4 4   CHLORIDE mmol/L 107   CO2 mmol/L 27   BUN mg/dL 15   CREATININE mg/dL 1 07   CALCIUM mg/dL 8 2*   ALK PHOS U/L 75   ALT U/L 35   AST U/L 15           * I Have Reviewed All Lab Data Listed Above    * Additional Pertinent Lab Tests Reviewed: All Labs Within Last 24 Hours Reviewed        Recent Cultures (last 7 days):     Results from last 7 days   Lab Units 07/25/20  1019   BLOOD CULTURE  Received in Microbiology Lab  Culture in Progress  Received in Microbiology Lab  Culture in Progress  Last 24 Hours Medication List:     Current Facility-Administered Medications:  acetaminophen 650 mg Oral Q6H PRN Ana Paula Centeno MD    aluminum-magnesium hydroxide-simethicone 30 mL Oral Q6H PRN Alyson Centeno MD    cefTRIAXone 1,000 mg Intravenous Q24H Alyson Centeno MD    docusate sodium 100 mg Oral BID Robert Martinez MD    enoxaparin 40 mg Subcutaneous Daily Robert Martinez MD    ondansetron 4 mg Intravenous Q6H PRN Robert Martinez MD    oxyCODONE-acetaminophen 1 tablet Oral Q6H PRN Robert Martinez MD    pravastatin 40 mg Oral Daily With Jenell Sandhoff, MD    sodium chloride 75 mL/hr Intravenous Continuous Robert Martinez MD Last Rate: 75 mL/hr (07/25/20 1400)   vancomycin 17 5 mg/kg (Adjusted) Intravenous Q12H Robert Martinez MD         Today, Patient Was Seen By: Robert Martinez MD    ** Please Note: Dragon 360 Dictation voice to text software may have been used in the creation of this document   **

## 2020-07-26 NOTE — PROGRESS NOTES
Vancomycin Assessment    Moisés Velez is a 77 y o  male who is currently receiving vancomycin 1000 mg iv q 12 hrs for skin-soft tissue infection     Relevant clinical data and objective history reviewed:  Creatinine   Date Value Ref Range Status   07/26/2020 1 07 0 60 - 1 30 mg/dL Final     Comment:     Standardized to IDMS reference method   07/25/2020 1 39 (H) 0 60 - 1 30 mg/dL Final     Comment:     Standardized to IDMS reference method     /78   Pulse (!) 54   Temp 97 9 °F (36 6 °C) (Oral)   Resp 19   Ht 5' 10" (1 778 m)   Wt 105 kg (231 lb 7 7 oz)   SpO2 96%   BMI 33 21 kg/m²   I/O last 3 completed shifts: In: 2922 5 [P O :1440; I V :432 5; IV Piggyback:1050]  Out: -   Lab Results   Component Value Date/Time    BUN 15 07/26/2020 05:27 AM    WBC 6 19 07/26/2020 05:27 AM    HGB 14 6 07/26/2020 05:27 AM    HCT 44 7 07/26/2020 05:27 AM    MCV 91 07/26/2020 05:27 AM     07/26/2020 05:27 AM     Temp Readings from Last 3 Encounters:   07/26/20 97 9 °F (36 6 °C) (Oral)     Vancomycin Days of Therapy: 2    Assessment/Plan  The patient is currently on vancomycin utilizing scheduled dosing  Baseline risks associated with therapy include: dehydration  The patient is receiving 1000 mg iv q 12 hrs with the patient's renal functions being much improved and a goal trough of 15-20 (appropriate for most indications) ; the dose will be changed to 1500 mg iv q 12 hrs   Pharmacy will continue to follow closely for s/sx of nephrotoxicity, infusion reactions and appropriateness of therapy  BMP and CBC will be ordered per protocol  Plan for trough as patient approaches steady state, prior to the 5th  dose at approximately 1130 on 7/27/20  Pharmacy will continue to follow the patients culture results and clinical progress daily      Ivory Jacobson, Pharmacist

## 2020-07-26 NOTE — ASSESSMENT & PLAN NOTE
Baseline creatinine is 1 00  Patient recent creatinine is 1 39  Continue gentle hydration  Condition improved

## 2020-07-26 NOTE — ASSESSMENT & PLAN NOTE
Outpatient treatment failure  Recently evaluated by surgical team  Hemodynamically stable  No signs or symptoms of sirs or sepsis  Patient received IV ceftriaxone and vancomycin in the ER-will continue  Lactic acid is in normal range  Continue IV antibiotic  Follow , blood culture  MRI:Areas of dorsal and forefoot subcutaneous edema without an abscess or osteomyelitis  Mild edema of the distal tip of the first distal phalanx with an underlying area of linear signal abnormality  Please assess for tenderness to exclude an underlying nondisplaced fracture  #7/14 and #6/14  Differential diagnosis also includes mild   reactive edema, to the overlying cellulitis , overlying a chronic healed injury

## 2020-07-27 VITALS
TEMPERATURE: 97.7 F | SYSTOLIC BLOOD PRESSURE: 149 MMHG | OXYGEN SATURATION: 97 % | HEIGHT: 70 IN | HEART RATE: 53 BPM | WEIGHT: 229.6 LBS | DIASTOLIC BLOOD PRESSURE: 78 MMHG | BODY MASS INDEX: 32.87 KG/M2 | RESPIRATION RATE: 20 BRPM

## 2020-07-27 PROCEDURE — 99239 HOSP IP/OBS DSCHRG MGMT >30: CPT | Performed by: FAMILY MEDICINE

## 2020-07-27 RX ORDER — DOXYCYCLINE HYCLATE 100 MG/1
100 CAPSULE ORAL EVERY 12 HOURS SCHEDULED
Qty: 28 CAPSULE | Refills: 0 | Status: SHIPPED | OUTPATIENT
Start: 2020-07-27 | End: 2020-07-27 | Stop reason: SDUPTHER

## 2020-07-27 RX ORDER — ACETAMINOPHEN 325 MG/1
650 TABLET ORAL EVERY 6 HOURS PRN
Qty: 30 TABLET | Refills: 0 | Status: SHIPPED | OUTPATIENT
Start: 2020-07-27 | End: 2021-09-22 | Stop reason: ALTCHOICE

## 2020-07-27 RX ORDER — DOXYCYCLINE HYCLATE 100 MG/1
100 CAPSULE ORAL EVERY 12 HOURS SCHEDULED
Qty: 28 CAPSULE | Refills: 0 | Status: SHIPPED | OUTPATIENT
Start: 2020-07-27 | End: 2020-08-10

## 2020-07-27 RX ADMIN — SODIUM CHLORIDE 75 ML/HR: 0.9 INJECTION, SOLUTION INTRAVENOUS at 07:53

## 2020-07-27 RX ADMIN — ENOXAPARIN SODIUM 40 MG: 40 INJECTION SUBCUTANEOUS at 08:07

## 2020-07-27 NOTE — DISCHARGE INSTRUCTIONS
Cellulitis   WHAT YOU NEED TO KNOW:   Cellulitis is a skin infection caused by bacteria  Cellulitis may go away on its own or you may need treatment  Your healthcare provider may draw a [place] around the outside edges of your cellulitis  If your cellulitis spreads, your healthcare provider will see it outside of the [place]  DISCHARGE INSTRUCTIONS:   Call 911 if:   · You have sudden trouble breathing or chest pain  Return to the emergency department if:   · Your wound gets larger and more painful  · You feel a crackling under your skin when you touch it  · You have purple dots or bumps on your skin, or you see bleeding under your skin  · You have new swelling and pain in your legs  · The red, warm, swollen area gets larger  · You see red streaks coming from the infected area  Contact your healthcare provider if:   · You have a fever  · Your fever or pain does not go away or gets worse  · The area does not get smaller after 2 days of antibiotics  · Your skin is flaking or peeling off  · You have questions or concerns about your condition or care  Medicines:   · Antibiotics  help treat the bacterial infection  · NSAIDs , such as ibuprofen, help decrease swelling, pain, and fever  NSAIDs can cause stomach bleeding or kidney problems in certain people  If you take blood thinner medicine, always ask if NSAIDs are safe for you  Always read the medicine label and follow directions  Do not give these medicines to children under 10months of age without direction from your child's healthcare provider  · Acetaminophen  decreases pain and fever  It is available without a doctor's order  Ask how much to take and how often to take it  Follow directions  Read the labels of all other medicines you are using to see if they also contain acetaminophen, or ask your doctor or pharmacist  Acetaminophen can cause liver damage if not taken correctly   Do not use more than 4 grams (4,000 milligrams) total of acetaminophen in one day  · Take your medicine as directed  Contact your healthcare provider if you think your medicine is not helping or if you have side effects  Tell him or her if you are allergic to any medicine  Keep a list of the medicines, vitamins, and herbs you take  Include the amounts, and when and why you take them  Bring the list or the pill bottles to follow-up visits  Carry your medicine list with you in case of an emergency  Self-care:   · Elevate the area above the level of your heart  as often as you can  This will help decrease swelling and pain  Prop the area on pillows or blankets to keep it elevated comfortably  · Clean the area daily until the wound scabs over  Gently wash the area with soap and water  Pat dry  Use dressings as directed  · Place cool or warm, wet cloths on the area as directed  Use clean cloths and clean water  Leave it on the area until the cloth is room temperature  Pat the area dry with a clean, dry cloth  The cloths may help decrease pain  Prevent cellulitis:   · Do not scratch bug bites or areas of injury  You increase your risk for cellulitis by scratching these areas  · Do not share personal items, such as towels, clothing, and razors  · Clean exercise equipment  with germ-killing  before and after you use it  · Wash your hands often  Use soap and water  Wash your hands after you use the bathroom, change a child's diapers, or sneeze  Wash your hands before you prepare or eat food  Use lotion to prevent dry, cracked skin  · Wear pressure stockings as directed  You may be told to wear the stockings if you have peripheral edema  The stockings improve blood flow and decrease swelling  · Treat athlete's foot  This can help prevent the spread of a bacterial skin infection  Follow up with your healthcare provider within 3 days, or as directed:   Your healthcare provider will check if your cellulitis is getting better  You may need different medicine  Write down your questions so you remember to ask them during your visits  © 2017 [employer] Information is for End User's use only and may not be sold, redistributed or otherwise used for commercial purposes  All illustrations and images included in CareNotes® are the copyrighted property of [employer]  or [employer]  The above information is an  only  It is not intended as medical advice for individual conditions or treatments  Talk to your doctor, nurse or pharmacist before following any medical regimen to see if it is safe and effective for you  Acute Kidney Injury   AMBULATORY CARE:   Acute kidney injury (LATA) is also called acute kidney failure, or acute renal failure  LATA happens when your kidneys suddenly stop working correctly  Normally, the kidneys remove fluid, chemicals, and waste from your blood  These wastes are turned into urine by your kidneys  LATA usually happens over hours or days  When you have LATA, your kidneys do not remove the waste, chemicals, or extra fluid from your body  A normal amount of urine is not produced  LATA is usually temporary, but it may become a chronic kidney condition  Causes of LATA:   · Decreased blood flow to the kidney, such as from hypercalcemia (high blood calcium level) or severe heart disease     · A disease or condition that affects the kidneys, such as hypertension (high blood pressure) or diabetes     · A blockage in the kidney or ureter, such as a kidney or bladder stone, enlarged prostate, or tumor  Common symptoms include the following: You may not have any symptoms with early or mild LATA   As LATA progresses, you may have any of the following:  · Decrease in the amount of urine or no urination    · Swelling in your arms, legs, or feet     · Weakness, drowsiness, or no appetite    · Nausea, flank pain, muscle twitching or muscle cramps    · Itchy skin, or your, breath or body smells like urine    · Behavior changes, confusion, disorientation, or seizures  Call 911 if:   · You have sudden chest pain or trouble breathing  Seek care immediately if:   · Your symptoms get worse  Contact your healthcare provider if:   · Your symptoms return  · Your blood sugar or blood pressure level is not within the range your healthcare provider recommends  · You have questions or concerns about your condition or care  Treatment for LATA  depends upon the cause of your acute kidney injury and how severe it is  Usually, LATA will be monitored in the hospital  If you have mild LATA, you may be able to go home to recover  Your healthcare providers will treat the cause of your LATA  You may need IV fluids if your LATA was caused by little or no fluid in your body  You may need dialysis to remove waste and extra fluid from your body  Nutrition:  Your healthcare provider may tell you to eat food low in sodium (salt), potassium, phosphorus, or protein  A dietitian can help you plan your meals  Drink liquids as directed: Your healthcare provider may recommend that you drink a certain amount of liquids  This will help your kidneys work better and decrease your risk for dehydration  Ask how much liquid to drink each day and which liquids are best for you  What you can do to manage and prevent LATA:   · Monitor and manage other health conditions  such as diabetes, high blood pressure, or heart disease  These conditions increase your risk for acute kidney injury  Take your medicines for these conditions as directed  Also, monitor your blood sugar and blood pressure levels as directed  Contact your healthcare provider if your levels are not in the range he or she says it should be  · Talk to your healthcare provider before you take over-the-counter-medicine  NSAIDs, stomach medicine, or laxatives may harm your kidneys and increase your risk for acute kidney injury   If it is okay to take the medicine, follow the directions on the package  Do not take more than directed  · Tell healthcare providers you have had acute kidney injury  before you get contrast liquid for an x-ray or CT scan  Your healthcare provider may give you medicine to prevent kidney problems caused by the liquid  Follow up with your healthcare provider as directed:  Write down your questions so you remember to ask them during your visits  © 2017 [employer] Information is for End User's use only and may not be sold, redistributed or otherwise used for commercial purposes  All illustrations and images included in CareNotes® are the copyrighted property of [employer] , [employer]  or Chris Borges  The above information is an  only  It is not intended as medical advice for individual conditions or treatments  Talk to your doctor, nurse or pharmacist before following any medical regimen to see if it is safe and effective for you

## 2020-07-27 NOTE — ASSESSMENT & PLAN NOTE
Outpatient treatment failure  Recently evaluated by surgical team  Hemodynamically stable  No signs or symptoms of sirs or sepsis  Blood culture shows no growth  Patient received IV antibiotic with ceftriaxone and vancomycin for 2 days-will be discharged on doxycycline p o  For 2 weeks  MRI:Areas of dorsal and forefoot subcutaneous edema without an abscess or osteomyelitis  Mild edema of the distal tip of the first distal phalanx with an underlying area of linear signal abnormality  Please assess for tenderness to exclude an underlying nondisplaced fracture  #7/14 and #6/14  Differential diagnosis also includes mild   reactive edema, to the overlying cellulitis , overlying a chronic healed injury

## 2020-07-27 NOTE — UTILIZATION REVIEW
Notification of Inpatient Admission/Inpatient Authorization Request   This is a Notification of Inpatient Admission for [address]  Be advised that this patient was admitted to our facility under Inpatient Status  Contact Ayush Farris at [phone] for additional admission information  [facility] DEPT  DEDICATED [phone]  Patient Name:   Constance Mcfarlane   YOB: 1955       [address]   [address]:   [address]  Tax ID: [id number]  NPI: [id number] Attending Provider/NPI:  Phone:  Address: Carissa Garcia Md [[id number]]  [address] [place] [address] Code: 24 Place of Service Name:  [address] [place] [address]   Start Date: 7/25/20 1229     Discharge Date & Time: No discharge date for patient encounter  Type of Admission: Inpatient Status Discharge Disposition (if discharged): Final discharge disposition not confirmed   Patient Diagnoses: Rash [R21]  Cellulitis of right foot [O59 301]     Orders: Admission Orders (From admission, onward)     Ordered        07/25/20 1229  Inpatient Admission (expected length of stay for this patient Order details is greater than two midnights)  Once                    Assigned Utilization Review Contact: Ayush Farris  Utilization   Network Utilization Review Department  Phone: [phone]; Fax [phone]  Email: [email or web address]   ATTENTION PAYERS: Please call the assigned Utilization  directly with any questions or concerns ALL voicemails in the department are confidential  Send all requests for admission clinical reviews, approved or denied determinations and any other requests to dedicated fax number belonging to the campus where the patient is receiving treatment

## 2020-07-27 NOTE — INCIDENTAL FINDINGS
The following findings require follow up:  Radiographic finding   Finding: MRI foot/forefoot toes right w wo contrast: Areas of dorsal and forefoot subcutaneous edema without an abscess or osteomyelitis , Mild edema of the distal tip of the first distal phalanx with an underlying area of linear signal abnormality  Please assess for tenderness to exclude an underlying nondisplaced fracture  #7/14 and #6/14   Differential diagnosis also includes mild , reactive edema, to the overlying cellulitis ,   Follow up required: Yes   Follow up should be done within 1 week(s)    Please notify the following clinician to assist with the follow up:   Dr Jazmín Montilla MD   [address] [place] [address] ([facility]) - Internal Medicine    [phone]

## 2020-07-27 NOTE — PLAN OF CARE
Problem: PAIN - ADULT  Goal: Verbalizes/displays adequate comfort level or baseline comfort level  Description  Interventions:  - Encourage patient to monitor pain and request assistance  - Assess pain using appropriate pain scale  - Administer analgesics based on type and severity of pain and evaluate response  - Implement non-pharmacological measures as appropriate and evaluate response  - Consider cultural and social influences on pain and pain management  - Notify physician/advanced practitioner if interventions unsuccessful or patient reports new pain  Outcome: Progressing     Problem: INFECTION - ADULT  Goal: Absence or prevention of progression during hospitalization  Description  INTERVENTIONS:  - Assess and monitor for signs and symptoms of infection  - Monitor lab/diagnostic results  - Monitor all insertion sites, i e  indwelling lines, tubes, and drains  - Monitor endotracheal if appropriate and nasal secretions for changes in amount and color  - [place] appropriate cooling/warming therapies per order  - Administer medications as ordered  - Instruct and encourage patient and family to use good hand hygiene technique  - Identify and instruct in appropriate isolation precautions for identified infection/condition  Outcome: Progressing  Goal: Absence of fever/infection during neutropenic period  Description  INTERVENTIONS:  - Monitor WBC    Outcome: Progressing     Problem: SAFETY ADULT  Goal: Patient will remain free of falls  Description  INTERVENTIONS:  - Assess patient frequently for physical needs  -  Identify cognitive and physical deficits and behaviors that affect risk of falls    -  [place] fall precautions as indicated by assessment   - Educate patient/family on patient safety including physical limitations  - Instruct patient to call for assistance with activity based on assessment  - Modify environment to reduce risk of injury  - Consider OT/PT consult to assist with strengthening/mobility  Outcome: Progressing  Goal: Maintain or return to baseline ADL function  Description  INTERVENTIONS:  -  Assess patient's ability to carry out ADLs; assess patient's baseline for ADL function and identify physical deficits which impact ability to perform ADLs (bathing, care of mouth/teeth, toileting, grooming, dressing, etc )  - Assess/evaluate cause of self-care deficits   - Assess range of motion  - Assess patient's mobility; develop plan if impaired  - Assess patient's need for assistive devices and provide as appropriate  - Encourage maximum independence but intervene and supervise when necessary  - Involve family in performance of ADLs  - Assess for home care needs following discharge   - Consider OT consult to assist with ADL evaluation and planning for discharge  - Provide patient education as appropriate  Outcome: Progressing  Goal: Maintain or return mobility status to optimal level  Description  INTERVENTIONS:  - Assess patient's baseline mobility status (ambulation, transfers, stairs, etc )    - Identify cognitive and physical deficits and behaviors that affect mobility  - Identify mobility aids required to assist with transfers and/or ambulation (gait belt, sit-to-stand, lift, walker, cane, etc )  - [place] fall precautions as indicated by assessment  - Record patient progress and toleration of activity level on Mobility SBAR; progress patient to next Phase/Stage  - Instruct patient to call for assistance with activity based on assessment  - Consider rehabilitation consult to assist with strengthening/weightbearing, etc   Outcome: Progressing     Problem: DISCHARGE PLANNING  Goal: Discharge to home or other facility with appropriate resources  Description  INTERVENTIONS:  - Identify barriers to discharge w/patient and caregiver  - Arrange for needed discharge resources and transportation as appropriate  - Identify discharge learning needs (meds, wound care, etc )  - Arrange for interpretive services to assist at discharge as needed  - Refer to Case Management Department for coordinating discharge planning if the patient needs post-hospital services based on physician/advanced practitioner order or complex needs related to functional status, cognitive ability, or social support system  Outcome: Progressing     Problem: Knowledge Deficit  Goal: Patient/family/caregiver demonstrates understanding of disease process, treatment plan, medications, and discharge instructions  Description  Complete learning assessment and assess knowledge base    Interventions:  - Provide teaching at level of understanding  - Provide teaching via preferred learning methods  Outcome: Progressing

## 2020-07-27 NOTE — NURSING NOTE
Discharge and medication instructions given to pt and wife  IV d/c'd  No written scripts given  No further questions or concerns  verbal instruction/written material

## 2020-07-27 NOTE — DISCHARGE SUMMARY
Discharge- Jose Alberto Hind 1955, 77 y o  male MRN: [id number]    Unit/Bed#: -01 Encounter: [id number]    Primary Care Provider: Marlon Ramírez MD   Date and time admitted to hospital: 7/25/2020  9:40 AM        * Cellulitis of right foot  Assessment & Plan  Outpatient treatment failure  Recently evaluated by surgical team  Hemodynamically stable  No signs or symptoms of sirs or sepsis  Blood culture shows no growth  Patient received IV antibiotic with ceftriaxone and vancomycin for 2 days-will be discharged on doxycycline p o  For 2 weeks  MRI:Areas of dorsal and forefoot subcutaneous edema without an abscess or osteomyelitis  Mild edema of the distal tip of the first distal phalanx with an underlying area of linear signal abnormality  Please assess for tenderness to exclude an underlying nondisplaced fracture  #7/14 and #6/14  Differential diagnosis also includes mild   reactive edema, to the overlying cellulitis , overlying a chronic healed injury  LATA (acute kidney injury) ([facility] [address] )  Assessment & Plan  Baseline creatinine is 1 00  Resolved with hydration        Discharging Physician / Practitioner: Hitesh Joiner MD  PCP: Marlon Ramírez MD  Admission Date:   Admission Orders (From admission, onward)     Ordered        07/25/20 1229  Inpatient Admission (expected length of stay for this patient Order details is greater than two midnights)  Once                   Discharge Date: 07/27/20        Consultations During Hospital Stay:  · None    Procedures Performed:   · MRI of the right foot:Areas of dorsal and forefoot subcutaneous edema without an abscess or osteomyelitis  Mild edema of the distal tip of the first distal phalanx with an underlying area of linear signal abnormality  Please assess for tenderness to exclude an underlying nondisplaced fracture  #7/14 and #6/14   Differential diagnosis also includes mild   reactive edema, to the overlying cellulitis , overlying a chronic healed injury  Significant Findings / Test Results:   Lab Results   Component Value Date    WBC 6 19 07/26/2020    HGB 14 6 07/26/2020    HCT 44 7 07/26/2020    MCV 91 07/26/2020     07/26/2020   ·   Lab Results   Component Value Date    SODIUM 140 07/26/2020    K 4 4 07/26/2020     07/26/2020    CO2 27 07/26/2020    AGAP 6 07/26/2020    BUN 15 07/26/2020    CREATININE 1 07 07/26/2020    GLUC 102 07/26/2020    CALCIUM 8 2 (L) 07/26/2020    AST 15 07/26/2020    ALT 35 07/26/2020    ALKPHOS 75 07/26/2020    TP 5 8 (L) 07/26/2020    TBILI 0 41 07/26/2020    EGFR 71 07/26/2020   ·   Procedure Component Value - Date/Time   Blood culture #1 [[id number]] Collected: 07/25/20 1019   Lab Status: Preliminary result Specimen: Blood from Arm, Right Updated: 07/26/20 1401    Blood Culture No Growth at 24 hrs  Blood culture #2 [[id number]] Collected: 07/25/20 1019   Lab Status: Preliminary result Specimen: Blood from Arm, Left Updated: 07/26/20 1401    Blood Culture No Growth at 24 hrs  ·     Incidental Findings:   · none     Test Results Pending at Discharge (will require follow up): · Lyme antibody profile with reflex was time blood     Outpatient Tests Requested:  · none    Complications:  none    Reason for Admission:  Cellulitis and right    Hospital Course:     Mario Simeon is a 77 y o  male patient who originally presented to the hospital on 7/25/2020 due to cellulitis of right foot, outpatient treatment failure  As well as a rash  Patient received IV antibiotic with ceftriaxone and vancomycin for 2 days, with the treatment patient's condition significantly improved  Patient remain hemodynamically stable, blood culture came back normal   MRI of the foot shows no osteomyelitis or abscess  Patient's rash also improved  Lyme panel pending    Patient will be discharged with p o  Doxycycline for 2 weeks  Patient is to be followed up with PCP in 1 week    If symptoms get worse, patient needs to be come back to hospital     Please see above list of diagnoses and related plan for additional information  Condition at Discharge: stable     Discharge Day Visit / Exam:     Subjective:  Seen and evaluated during the round  Patient denies any significant complaint  Vitals: Blood Pressure: 149/78 (07/26/20 2313)  Pulse: (!) 53 (07/26/20 2313)  Temperature: 97 7 °F (36 5 °C) (07/26/20 2313)  Temp Source: Oral (07/26/20 0732)  Respirations: 20 (07/26/20 2313)  Height: 5' 10" (177 8 cm) (07/25/20 0945)  Weight - Scale: 104 kg (229 lb 9 6 oz) (07/27/20 0810)  SpO2: 97 % (07/26/20 2313)  Exam:   Physical Exam   Constitutional: He is oriented to person, place, and time  He appears well-developed  No distress  HENT:   Mouth/Throat: Oropharynx is clear and moist  No oropharyngeal exudate  Eyes: Pupils are equal, round, and reactive to light  Conjunctivae and EOM are normal  No scleral icterus  Neck: Normal range of motion  Neck supple  No JVD present  Cardiovascular: S1 normal, S2 normal and normal heart sounds  Bradycardia present  Exam reveals no gallop  Pulmonary/Chest: Effort normal and breath sounds normal  No stridor  No respiratory distress  He has no wheezes  Abdominal: Soft  Bowel sounds are normal  He exhibits no distension  There is no tenderness  There is no guarding  Musculoskeletal: Normal range of motion  He exhibits tenderness (mild on right foot)  Neurological: He is alert and oriented to person, place, and time  He displays normal reflexes  No cranial nerve deficit or sensory deficit  He exhibits normal muscle tone  Coordination normal    Skin: Skin is warm  There is erythema (fading)  Psychiatric: He has a normal mood and affect  Nursing note and vitals reviewed  Discussion with Family: none    Discharge instructions/Information to patient and family:   See after visit summary for information provided to patient and family        Provisions for Follow-Up Care:  See after visit summary for information related to follow-up care and any pertinent home health orders  Disposition:     Home    For Discharges to [facility]:   · Not Applicable to this Patient - Not Applicable to this Patient    Planned Readmission:  If symptoms get worse     Discharge Statement:  I spent >35 minutes discharging the patient  This time was spent on the day of discharge  I had direct contact with the patient on the day of discharge  Greater than 50% of the total time was spent examining patient, answering all patient questions, arranging and discussing plan of care with patient as well as directly providing post-discharge instructions  Additional time then spent on discharge activities  Discharge Medications:  See after visit summary for reconciled discharge medications provided to patient and family        ** Please Note: This note has been constructed using a voice recognition system **

## 2020-07-27 NOTE — PLAN OF CARE
Problem: PAIN - ADULT  Goal: Verbalizes/displays adequate comfort level or baseline comfort level  Description  Interventions:  - Encourage patient to monitor pain and request assistance  - Assess pain using appropriate pain scale  - Administer analgesics based on type and severity of pain and evaluate response  - Implement non-pharmacological measures as appropriate and evaluate response  - Consider cultural and social influences on pain and pain management  - Notify physician/advanced practitioner if interventions unsuccessful or patient reports new pain  Outcome: Progressing     Problem: INFECTION - ADULT  Goal: Absence or prevention of progression during hospitalization  Description  INTERVENTIONS:  - Assess and monitor for signs and symptoms of infection  - Monitor lab/diagnostic results  - Monitor all insertion sites, i e  indwelling lines, tubes, and drains  - Monitor endotracheal if appropriate and nasal secretions for changes in amount and color  - [place] appropriate cooling/warming therapies per order  - Administer medications as ordered  - Instruct and encourage patient and family to use good hand hygiene technique  - Identify and instruct in appropriate isolation precautions for identified infection/condition  Outcome: Progressing  Goal: Absence of fever/infection during neutropenic period  Description  INTERVENTIONS:  - Monitor WBC    Outcome: Progressing     Problem: SAFETY ADULT  Goal: Patient will remain free of falls  Description  INTERVENTIONS:  - Assess patient frequently for physical needs  -  Identify cognitive and physical deficits and behaviors that affect risk of falls    -  [place] fall precautions as indicated by assessment   - Educate patient/family on patient safety including physical limitations  - Instruct patient to call for assistance with activity based on assessment  - Modify environment to reduce risk of injury  - Consider OT/PT consult to assist with strengthening/mobility  Outcome: Progressing  Goal: Maintain or return to baseline ADL function  Description  INTERVENTIONS:  -  Assess patient's ability to carry out ADLs; assess patient's baseline for ADL function and identify physical deficits which impact ability to perform ADLs (bathing, care of mouth/teeth, toileting, grooming, dressing, etc )  - Assess/evaluate cause of self-care deficits   - Assess range of motion  - Assess patient's mobility; develop plan if impaired  - Assess patient's need for assistive devices and provide as appropriate  - Encourage maximum independence but intervene and supervise when necessary  - Involve family in performance of ADLs  - Assess for home care needs following discharge   - Consider OT consult to assist with ADL evaluation and planning for discharge  - Provide patient education as appropriate  Outcome: Progressing  Goal: Maintain or return mobility status to optimal level  Description  INTERVENTIONS:  - Assess patient's baseline mobility status (ambulation, transfers, stairs, etc )    - Identify cognitive and physical deficits and behaviors that affect mobility  - Identify mobility aids required to assist with transfers and/or ambulation (gait belt, sit-to-stand, lift, walker, cane, etc )  - [place] fall precautions as indicated by assessment  - Record patient progress and toleration of activity level on Mobility SBAR; progress patient to next Phase/Stage  - Instruct patient to call for assistance with activity based on assessment  - Consider rehabilitation consult to assist with strengthening/weightbearing, etc   Outcome: Progressing     Problem: DISCHARGE PLANNING  Goal: Discharge to home or other facility with appropriate resources  Description  INTERVENTIONS:  - Identify barriers to discharge w/patient and caregiver  - Arrange for needed discharge resources and transportation as appropriate  - Identify discharge learning needs (meds, wound care, etc )  - Arrange for interpretive services to assist at discharge as needed  - Refer to Case Management Department for coordinating discharge planning if the patient needs post-hospital services based on physician/advanced practitioner order or complex needs related to functional status, cognitive ability, or social support system  Outcome: Progressing     Problem: Knowledge Deficit  Goal: Patient/family/caregiver demonstrates understanding of disease process, treatment plan, medications, and discharge instructions  Description  Complete learning assessment and assess knowledge base    Interventions:  - Provide teaching at level of understanding  - Provide teaching via preferred learning methods  Outcome: Progressing

## 2020-07-27 NOTE — UTILIZATION REVIEW
Initial Clinical Review    Admission: Date/Time/Statement: Admission Orders (From admission, onward)     Ordered        07/25/20 1229  Inpatient Admission (expected length of stay for this patient Order details is greater than two midnights)  Once                   Orders Placed This Encounter   Procedures    Inpatient Admission (expected length of stay for this patient Order details is greater than two midnights)     Standing Status:   Standing     Number of Occurrences:   1     Order Specific Question:   Admitting Physician     Answer:   Angelo Trujillo [[id number]]     Order Specific Question:   Level of Care     Answer:   Med Surg [[id number]]     Order Specific Question:   Estimated length of stay     Answer:   More than 2 Midnights     Order Specific Question:   Certification     Answer:   I certify that inpatient services are medically necessary for this patient for a duration of greater than two midnights  See H&P and MD Progress Notes for additional information about the patient's course of treatment  ED Arrival Information     Expected Arrival Acuity Means of Arrival Escorted By Service Admission Type    - 7/25/2020 09:30 Urgent Walk-In Spouse Hospitalist Urgent    Arrival Complaint    rash         Chief Complaint   Patient presents with    Leg Pain     pt with red and warm foot that travels to the knee  right leg Pt is scheduled for MRI today at 1000  Assessment/Plan: 77year old male to the ED from home with complaints of right left pain, redness and swelling for 3 weeks prior to arrival   Has completed courses of doxycycline, Bactrim and is currently on Keflex  Scheduled for MRI  Admitted to inpatient for cellulitis right foot, LATA  He has areas of redness/rash under right breast, and above right knee  Blood cultures pending  IV fluids and IV abx initiated in the ED  MRI:Areas of dorsal and forefoot subcutaneous edema without an abscess or osteomyelitis  Baseline crea is 1 00   1 39 on admit   He has failed outpatient treatment of cellulitis right foot  7/26 UPdate:  Redness improving  Continues with foot pain  Continue IV fluids and IV ab    ED Triage Vitals [07/25/20 0945]   Temperature Pulse Respirations Blood Pressure SpO2   (!) 97 4 °F (36 3 °C) 80 16 159/84 95 %      Temp Source Heart Rate Source Patient Position - Orthostatic VS BP Location FiO2 (%)   Temporal Monitor -- Left arm --      Pain Score       7        Wt Readings from Last 1 Encounters:   07/27/20 104 kg (229 lb 9 6 oz)     Additional Vital Signs:   Date/Time  Temp Pulse  Resp  BP  MAP (mmHg)  SpO2  O2 Device   07/26/20 23:13:39  97 7 °F (36 5 °C) 53Abnormal   20  149/78  102  97 %     07/26/20 15:44:49  98 2 °F (36 8 °C) 61  16  157/85  109  96 %     07/26/20 0900             None (Room air)   07/26/20 07:32:30  97 9 °F (36 6 °C) 54Abnormal   19  118/78  91  96 %     07/25/20 22:26:24  98 1 °F (36 7 °C) 61  17  120/73  89  95 %     07/25/20 1939           96 %  None (Room air)   07/25/20 15:27:45  98 1 °F (36 7 °C) 69  16  112/73  86  98 %     07/25/20 1328             None (Room air)   07/25/20 13:11:38  98 2 °F (36 8 °C) 62    148/75  99  100 %     07/25/20 1230   60    124/70  92  97 %     07/25/20 1200   66    118/60  83  96 %     07/25/20 1156   64  18  135/64           Pertinent Labs/Diagnostic Test Results:   7/25 MRI foot/forefoot right: Areas of dorsal and forefoot subcutaneous edema without an abscess or osteomyelitis  Mild edema of the distal tip of the first distal phalanx with an underlying area of linear signal abnormality  Please assess for tenderness to exclude an underlying nondisplaced fracture  #7/14 and #6/14  Differential diagnosis also includes mild   reactive edema, to the overlying cellulitis , overlying a chronic healed injury          Results from last 7 days   Lab Units 07/26/20  0527 07/25/20  1019   WBC Thousand/uL 6 19 7 85   HEMOGLOBIN g/dL 14 6 16 1   HEMATOCRIT % 44 7 48 4 PLATELETS Thousands/uL 234 248   NEUTROS ABS Thousands/µL 3 41 5 91         Results from last 7 days   Lab Units 07/26/20  0527 07/25/20  1019   SODIUM mmol/L 140 139   POTASSIUM mmol/L 4 4 3 8   CHLORIDE mmol/L 107 104   CO2 mmol/L 27 27   ANION GAP mmol/L 6 8   BUN mg/dL 15 17   CREATININE mg/dL 1 07 1 39*   EGFR ml/min/1 73sq m 71 52   CALCIUM mg/dL 8 2* 8 6     Results from last 7 days   Lab Units 07/26/20  0527 07/25/20  1019   AST U/L 15 20   ALT U/L 35 42   ALK PHOS U/L 75 91   TOTAL PROTEIN g/dL 5 8* 6 7   ALBUMIN g/dL 2 9* 3 5   TOTAL BILIRUBIN mg/dL 0 41 0 35         Results from last 7 days   Lab Units 07/26/20  0527 07/25/20  1019   GLUCOSE RANDOM mg/dL 102 145*       Results from last 7 days   Lab Units 07/25/20  1019   CK TOTAL U/L 170   CK MB INDEX % 2 1   CK MB ng/mL 3 6       Results from last 7 days   Lab Units 07/25/20  1019   LACTIC ACID mmol/L 1 3       Results from last 7 days   Lab Units 07/25/20  1019   BLOOD CULTURE  No Growth at 24 hrs  No Growth at 24 hrs       ED Treatment:   Medication Administration from 07/25/2020 0929 to 07/25/2020 1310       Date/Time Order Dose Route Action     07/25/2020 1022 sodium chloride 0 9 % bolus 1,000 mL 1,000 mL Intravenous New Bag     07/25/2020 1238 cefTRIAXone (ROCEPHIN) IVPB (premix) 1,000 mg 50 mL 1,000 mg Intravenous New Bag     07/25/2020 1254 vancomycin (VANCOCIN) 1,500 mg in sodium chloride 0 9 % 250 mL IVPB 1,500 mg Intravenous New Bag        Past Medical History:   Diagnosis Date    High cholesterol     Leg pain      Admitting Diagnosis: Rash [R21]  Cellulitis of right foot [L03 115]  Age/Sex: 77 y o  male  Admission Orders:  Ambulate 3 times a day  UP and OOB  SCDS  Hepatitis C antibody  Lyme antibody with reflex to WB    Scheduled Medications:    Medications:  cefTRIAXone 1,000 mg Intravenous Q24H   docusate sodium 100 mg Oral BID   enoxaparin 40 mg Subcutaneous Daily   pravastatin 40 mg Oral Daily With Dinner   vancomycin 17 5 mg/kg (Adjusted) Intravenous Q12H     Continuous IV Infusions:    sodium chloride 75 mL/hr Intravenous Continuous     PRN Meds:    acetaminophen 650 mg Oral Q6H PRN   aluminum-magnesium hydroxide-simethicone 30 mL Oral Q6H PRN   ondansetron 4 mg Intravenous Q6H PRN   oxyCODONE-acetaminophen 1 tablet Oral Q6H PRN       IP CONSULT TO PHARMACY    Network Utilization Review Department  [email or web address]  ATTENTION: Please call with any questions or concerns to [phone] and carefully listen to the prompts so that you are directed to the right person  All voicemails are confidential   Jeanna Valdez all requests for admission clinical reviews, approved or denied determinations and any other requests to dedicated fax number below belonging to the campus where the patient is receiving treatment   List of dedicated fax numbers for the Facilities:  [address] [place] [address] DENIALS (Administrative/Medical Necessity) [phone]   [address] (Maternity/NICU/Pediatrics) [phone]   Syl Martin [phone]   Jeri Hernandez [phone]   Pablo Patton [phone]   Gerri Nyhan [phone]   1205 [facility] [address] [place] [address] [phone]   [facility]  [phone]   2205 [facility]  [facility] 1000 [facility] [phone]

## 2020-07-27 NOTE — PLAN OF CARE
Problem: PAIN - ADULT  Goal: Verbalizes/displays adequate comfort level or baseline comfort level  Description  Interventions:  - Encourage patient to monitor pain and request assistance  - Assess pain using appropriate pain scale  - Administer analgesics based on type and severity of pain and evaluate response  - Implement non-pharmacological measures as appropriate and evaluate response  - Consider cultural and social influences on pain and pain management  - Notify physician/advanced practitioner if interventions unsuccessful or patient reports new pain  7/27/2020 1137 by Mert Cheung RN  Outcome: Completed  7/27/2020 1105 by Mert Cheung RN  Outcome: Progressing     Problem: INFECTION - ADULT  Goal: Absence or prevention of progression during hospitalization  Description  INTERVENTIONS:  - Assess and monitor for signs and symptoms of infection  - Monitor lab/diagnostic results  - Monitor all insertion sites, i e  indwelling lines, tubes, and drains  - Monitor endotracheal if appropriate and nasal secretions for changes in amount and color  - [place] appropriate cooling/warming therapies per order  - Administer medications as ordered  - Instruct and encourage patient and family to use good hand hygiene technique  - Identify and instruct in appropriate isolation precautions for identified infection/condition  7/27/2020 1137 by Mert Cheung RN  Outcome: Completed  7/27/2020 1105 by Mert Cheung RN  Outcome: Progressing  Goal: Absence of fever/infection during neutropenic period  Description  INTERVENTIONS:  - Monitor WBC    7/27/2020 1137 by Mert Cheung RN  Outcome: Completed  7/27/2020 1105 by Mert Cheung RN  Outcome: Progressing     Problem: SAFETY ADULT  Goal: Patient will remain free of falls  Description  INTERVENTIONS:  - Assess patient frequently for physical needs  -  Identify cognitive and physical deficits and behaviors that affect risk of falls    -  [place] fall precautions as indicated by assessment   - Educate patient/family on patient safety including physical limitations  - Instruct patient to call for assistance with activity based on assessment  - Modify environment to reduce risk of injury  - Consider OT/PT consult to assist with strengthening/mobility  7/27/2020 1137 by Natalie Rasheed RN  Outcome: Completed  7/27/2020 1105 by Natalie Rasheed RN  Outcome: Progressing  Goal: Maintain or return to baseline ADL function  Description  INTERVENTIONS:  -  Assess patient's ability to carry out ADLs; assess patient's baseline for ADL function and identify physical deficits which impact ability to perform ADLs (bathing, care of mouth/teeth, toileting, grooming, dressing, etc )  - Assess/evaluate cause of self-care deficits   - Assess range of motion  - Assess patient's mobility; develop plan if impaired  - Assess patient's need for assistive devices and provide as appropriate  - Encourage maximum independence but intervene and supervise when necessary  - Involve family in performance of ADLs  - Assess for home care needs following discharge   - Consider OT consult to assist with ADL evaluation and planning for discharge  - Provide patient education as appropriate  7/27/2020 1137 by Natalie Rasheed RN  Outcome: Completed  7/27/2020 1105 by Natalie Rasheed RN  Outcome: Progressing  Goal: Maintain or return mobility status to optimal level  Description  INTERVENTIONS:  - Assess patient's baseline mobility status (ambulation, transfers, stairs, etc )    - Identify cognitive and physical deficits and behaviors that affect mobility  - Identify mobility aids required to assist with transfers and/or ambulation (gait belt, sit-to-stand, lift, walker, cane, etc )  - [place] fall precautions as indicated by assessment  - Record patient progress and toleration of activity level on Mobility SBAR; progress patient to next Phase/Stage  - Instruct patient to call for assistance with activity based on assessment  - Consider rehabilitation consult to assist with strengthening/weightbearing, etc   7/27/2020 1137 by Celio Casas RN  Outcome: Completed  7/27/2020 1105 by Celio Casas RN  Outcome: Progressing     Problem: DISCHARGE PLANNING  Goal: Discharge to home or other facility with appropriate resources  Description  INTERVENTIONS:  - Identify barriers to discharge w/patient and caregiver  - Arrange for needed discharge resources and transportation as appropriate  - Identify discharge learning needs (meds, wound care, etc )  - Arrange for interpretive services to assist at discharge as needed  - Refer to Case Management Department for coordinating discharge planning if the patient needs post-hospital services based on physician/advanced practitioner order or complex needs related to functional status, cognitive ability, or social support system  7/27/2020 1137 by Celio Casas RN  Outcome: Completed  7/27/2020 1105 by Celio Casas RN  Outcome: Progressing     Problem: Knowledge Deficit  Goal: Patient/family/caregiver demonstrates understanding of disease process, treatment plan, medications, and discharge instructions  Description  Complete learning assessment and assess knowledge base    Interventions:  - Provide teaching at level of understanding  - Provide teaching via preferred learning methods  7/27/2020 1137 by Celio Casas RN  Outcome: Completed  7/27/2020 1105 by Celio Casas RN  Outcome: Progressing

## 2020-07-28 LAB
B BURGDOR IGG PATRN SER IB-IMP: NEGATIVE
B BURGDOR IGG+IGM SER-ACNC: 1.65 ISR (ref 0–0.9)
B BURGDOR IGM PATRN SER IB-IMP: POSITIVE
B BURGDOR18KD IGG SER QL IB: ABNORMAL
B BURGDOR23KD IGG SER QL IB: PRESENT
B BURGDOR23KD IGM SER QL IB: PRESENT
B BURGDOR28KD IGG SER QL IB: ABNORMAL
B BURGDOR30KD IGG SER QL IB: ABNORMAL
B BURGDOR39KD IGG SER QL IB: ABNORMAL
B BURGDOR39KD IGM SER QL IB: PRESENT
B BURGDOR41KD IGG SER QL IB: PRESENT
B BURGDOR41KD IGM SER QL IB: PRESENT
B BURGDOR45KD IGG SER QL IB: ABNORMAL
B BURGDOR58KD IGG SER QL IB: ABNORMAL
B BURGDOR66KD IGG SER QL IB: ABNORMAL
B BURGDOR93KD IGG SER QL IB: ABNORMAL

## 2020-07-28 NOTE — QUICK NOTE
Lyme panel came back positive, Lyme IgG/IgM is 1 65  Patient discharged on Doxycycline for 2 week  Discussed the result with patient over the phone

## 2020-07-29 NOTE — UTILIZATION REVIEW
Notification of Discharge  This is a Notification of Discharge from our facility [address]  Please be advised that this patient has been discharge from our facility  Below you will find the admission and discharge date and time including the patients disposition  PRESENTATION DATE: 7/25/2020  9:40 AM  OBS ADMISSION DATE:   IP ADMISSION DATE: 7/25/20 1229   DISCHARGE DATE: 7/27/2020 11:02 AM  DISPOSITION: Home/Self Care Home/Self Care   Admission Orders listed below:  Admission Orders (From admission, onward)     Ordered        07/25/20 1229  Inpatient Admission (expected length of stay for this patient Order details is greater than two midnights)  Once                   Please contact the UR Department if additional information is required to close this patient's authorization/case  [id number] [employer] Utilization Review Department  Main: [phone] x carefully listen to the prompts  All voicemails are confidential   [email or web address]  Send all requests for admission clinical reviews, approved or denied determinations and any other requests to dedicated fax number below belonging to the campus where the patient is receiving treatment   List of dedicated fax numbers:  [address] [place] [address] DENIALS (Administrative/Medical Necessity) [phone]   [address]  (Maternity/NICU/Pediatrics) [phone]   Alleen Endow [phone]     [address] [phone]   Oxana Heady [phone]   Hettie Lips [place] [address] [facility] [phone]   [facility]  [phone]   [address] [facility]  2401 [facility] 1000 [facility] [phone]

## 2020-07-30 LAB
BACTERIA BLD CULT: NORMAL
BACTERIA BLD CULT: NORMAL

## 2021-03-10 DIAGNOSIS — Z23 ENCOUNTER FOR IMMUNIZATION: ICD-10-CM

## 2021-03-20 ENCOUNTER — IMMUNIZATIONS (OUTPATIENT)
Dept: FAMILY MEDICINE CLINIC | Facility: HOSPITAL | Age: 66
End: 2021-03-20

## 2021-03-20 DIAGNOSIS — Z23 ENCOUNTER FOR IMMUNIZATION: Primary | ICD-10-CM

## 2021-03-20 PROCEDURE — 0001A SARS-COV-2 / COVID-19 MRNA VACCINE (PFIZER-BIONTECH) 30 MCG: CPT

## 2021-03-20 PROCEDURE — 91300 SARS-COV-2 / COVID-19 MRNA VACCINE (PFIZER-BIONTECH) 30 MCG: CPT

## 2021-04-11 ENCOUNTER — IMMUNIZATIONS (OUTPATIENT)
Dept: FAMILY MEDICINE CLINIC | Facility: HOSPITAL | Age: 66
End: 2021-04-11

## 2021-04-11 DIAGNOSIS — Z23 ENCOUNTER FOR IMMUNIZATION: Primary | ICD-10-CM

## 2021-04-11 PROCEDURE — 0002A SARS-COV-2 / COVID-19 MRNA VACCINE (PFIZER-BIONTECH) 30 MCG: CPT | Performed by: FAMILY MEDICINE

## 2021-04-11 PROCEDURE — 91300 SARS-COV-2 / COVID-19 MRNA VACCINE (PFIZER-BIONTECH) 30 MCG: CPT | Performed by: FAMILY MEDICINE

## 2021-09-22 ENCOUNTER — OFFICE VISIT (OUTPATIENT)
Dept: UROLOGY | Facility: MEDICAL CENTER | Age: 66
End: 2021-09-22
Payer: COMMERCIAL

## 2021-09-22 VITALS
SYSTOLIC BLOOD PRESSURE: 130 MMHG | HEIGHT: 67 IN | BODY MASS INDEX: 37.98 KG/M2 | HEART RATE: 71 BPM | WEIGHT: 242 LBS | DIASTOLIC BLOOD PRESSURE: 80 MMHG

## 2021-09-22 DIAGNOSIS — N32.81 OAB (OVERACTIVE BLADDER): Primary | ICD-10-CM

## 2021-09-22 DIAGNOSIS — N40.0 BPH WITHOUT URINARY OBSTRUCTION: ICD-10-CM

## 2021-09-22 DIAGNOSIS — R39.15 URINARY URGENCY: ICD-10-CM

## 2021-09-22 DIAGNOSIS — Z12.5 SPECIAL SCREENING FOR MALIGNANT NEOPLASM OF PROSTATE: ICD-10-CM

## 2021-09-22 LAB
POST-VOID RESIDUAL VOLUME, ML POC: 22 ML
SL AMB  POCT GLUCOSE, UA: NORMAL
SL AMB LEUKOCYTE ESTERASE,UA: NORMAL
SL AMB POCT BILIRUBIN,UA: NORMAL
SL AMB POCT BLOOD,UA: NORMAL
SL AMB POCT CLARITY,UA: CLEAR
SL AMB POCT COLOR,UA: YELLOW
SL AMB POCT KETONES,UA: NORMAL
SL AMB POCT NITRITE,UA: NORMAL
SL AMB POCT PH,UA: 5.5
SL AMB POCT SPECIFIC GRAVITY,UA: 1.02
SL AMB POCT URINE PROTEIN: NORMAL
SL AMB POCT UROBILINOGEN: 0.2

## 2021-09-22 PROCEDURE — 51798 US URINE CAPACITY MEASURE: CPT | Performed by: UROLOGY

## 2021-09-22 PROCEDURE — 99203 OFFICE O/P NEW LOW 30 MIN: CPT | Performed by: UROLOGY

## 2021-09-22 PROCEDURE — 81003 URINALYSIS AUTO W/O SCOPE: CPT | Performed by: UROLOGY

## 2021-09-22 RX ORDER — GUAIFENESIN AND DEXTROMETHORPHAN HYDROBROMIDE 1200; 60 MG/1; MG/1
TABLET, EXTENDED RELEASE ORAL
COMMUNITY
Start: 2021-07-23

## 2021-09-22 RX ORDER — OXYBUTYNIN CHLORIDE 5 MG/1
5 TABLET ORAL 3 TIMES DAILY PRN
Qty: 90 TABLET | Refills: 3 | Status: SHIPPED | OUTPATIENT
Start: 2021-09-22 | End: 2021-11-01 | Stop reason: HOSPADM

## 2021-09-22 RX ORDER — ASPIRIN 81 MG/1
81 TABLET ORAL
COMMUNITY

## 2021-09-22 RX ORDER — SILDENAFIL 100 MG/1
50 TABLET, FILM COATED ORAL DAILY PRN
COMMUNITY

## 2021-09-22 RX ORDER — CEPHALEXIN 500 MG/1
CAPSULE ORAL
COMMUNITY
Start: 2020-07-07 | End: 2021-11-01 | Stop reason: ALTCHOICE

## 2021-10-03 PROBLEM — N32.81 OAB (OVERACTIVE BLADDER): Status: ACTIVE | Noted: 2021-10-03

## 2021-10-03 PROBLEM — N40.0 BPH WITHOUT URINARY OBSTRUCTION: Status: ACTIVE | Noted: 2021-10-03

## 2021-10-22 ENCOUNTER — NURSE TRIAGE (OUTPATIENT)
Dept: OTHER | Facility: OTHER | Age: 66
End: 2021-10-22

## 2021-10-22 DIAGNOSIS — N32.81 OAB (OVERACTIVE BLADDER): Primary | ICD-10-CM

## 2021-10-25 RX ORDER — TROSPIUM CHLORIDE 20 MG/1
20 TABLET, FILM COATED ORAL 2 TIMES DAILY
Qty: 60 TABLET | Refills: 1 | Status: SHIPPED | OUTPATIENT
Start: 2021-10-25 | End: 2021-10-25

## 2021-10-25 RX ORDER — TROSPIUM CHLORIDE 20 MG/1
20 TABLET, FILM COATED ORAL 2 TIMES DAILY
Qty: 60 TABLET | Refills: 1 | Status: SHIPPED | OUTPATIENT
Start: 2021-10-25 | End: 2021-12-10 | Stop reason: SDUPTHER

## 2021-10-27 ENCOUNTER — TELEPHONE (OUTPATIENT)
Dept: UROLOGY | Facility: MEDICAL CENTER | Age: 66
End: 2021-10-27

## 2021-10-29 ENCOUNTER — HOSPITAL ENCOUNTER (OUTPATIENT)
Dept: ULTRASOUND IMAGING | Facility: HOSPITAL | Age: 66
Discharge: HOME | End: 2021-10-29
Payer: COMMERCIAL

## 2021-10-29 DIAGNOSIS — N50.82 SCROTAL PAIN: ICD-10-CM

## 2021-10-29 PROCEDURE — 76870 US EXAM SCROTUM: CPT

## 2021-11-01 ENCOUNTER — OFFICE VISIT (OUTPATIENT)
Dept: UROLOGY | Facility: MEDICAL CENTER | Age: 66
End: 2021-11-01
Payer: COMMERCIAL

## 2021-11-01 VITALS
DIASTOLIC BLOOD PRESSURE: 80 MMHG | SYSTOLIC BLOOD PRESSURE: 138 MMHG | HEART RATE: 68 BPM | BODY MASS INDEX: 37.83 KG/M2 | HEIGHT: 67 IN | WEIGHT: 241 LBS

## 2021-11-01 DIAGNOSIS — N45.1 EPIDIDYMITIS, LEFT: Primary | ICD-10-CM

## 2021-11-01 DIAGNOSIS — N32.81 OAB (OVERACTIVE BLADDER): ICD-10-CM

## 2021-11-01 DIAGNOSIS — N40.0 BPH WITHOUT URINARY OBSTRUCTION: ICD-10-CM

## 2021-11-01 LAB
SL AMB  POCT GLUCOSE, UA: NORMAL
SL AMB LEUKOCYTE ESTERASE,UA: NORMAL
SL AMB POCT BILIRUBIN,UA: NORMAL
SL AMB POCT BLOOD,UA: NORMAL
SL AMB POCT CLARITY,UA: CLEAR
SL AMB POCT COLOR,UA: YELLOW
SL AMB POCT KETONES,UA: NORMAL
SL AMB POCT NITRITE,UA: NORMAL
SL AMB POCT PH,UA: 5.5
SL AMB POCT SPECIFIC GRAVITY,UA: 1.03
SL AMB POCT URINE PROTEIN: NORMAL
SL AMB POCT UROBILINOGEN: 0.2

## 2021-11-01 PROCEDURE — 81003 URINALYSIS AUTO W/O SCOPE: CPT | Performed by: UROLOGY

## 2021-11-01 PROCEDURE — 99214 OFFICE O/P EST MOD 30 MIN: CPT | Performed by: UROLOGY

## 2021-11-01 RX ORDER — DOXYCYCLINE 100 MG/1
100 CAPSULE ORAL 2 TIMES DAILY
Qty: 28 CAPSULE | Refills: 1 | Status: SHIPPED | OUTPATIENT
Start: 2021-11-01 | End: 2021-11-15

## 2021-11-02 PROBLEM — N45.1 EPIDIDYMITIS, LEFT: Status: ACTIVE | Noted: 2021-11-02

## 2021-11-12 ENCOUNTER — TELEPHONE (OUTPATIENT)
Dept: OTHER | Facility: OTHER | Age: 66
End: 2021-11-12

## 2021-12-03 ENCOUNTER — TELEPHONE (OUTPATIENT)
Dept: OTHER | Facility: OTHER | Age: 66
End: 2021-12-03

## 2021-12-10 DIAGNOSIS — N32.81 OAB (OVERACTIVE BLADDER): ICD-10-CM

## 2021-12-13 RX ORDER — TROSPIUM CHLORIDE 20 MG/1
20 TABLET, FILM COATED ORAL 2 TIMES DAILY
Qty: 180 TABLET | Refills: 3 | Status: SHIPPED | OUTPATIENT
Start: 2021-12-13

## 2022-11-01 DIAGNOSIS — N32.81 OAB (OVERACTIVE BLADDER): ICD-10-CM

## 2022-11-01 RX ORDER — TROSPIUM CHLORIDE 20 MG/1
20 TABLET, FILM COATED ORAL 2 TIMES DAILY
Qty: 180 TABLET | Refills: 0 | Status: SHIPPED | OUTPATIENT
Start: 2022-11-01

## 2022-11-01 NOTE — TELEPHONE ENCOUNTER
Medication Refill Request     Name  trospium chloride (SANCTURA) 20 mg tablet  Dose/Frequency Take 1 tablet (20 mg total) by mouth 2 (two) times a day  Quantity 180  Verified pharmacy   [x]  Verified ordering Provider   [x]  Does patient have enough for the next 3 days?  Yes [x] No []

## 2023-02-20 ENCOUNTER — OFFICE VISIT (OUTPATIENT)
Dept: UROLOGY | Facility: CLINIC | Age: 68
End: 2023-02-20

## 2023-02-20 VITALS
SYSTOLIC BLOOD PRESSURE: 152 MMHG | TEMPERATURE: 98.3 F | BODY MASS INDEX: 35.22 KG/M2 | WEIGHT: 246 LBS | DIASTOLIC BLOOD PRESSURE: 80 MMHG | HEART RATE: 84 BPM | HEIGHT: 70 IN | OXYGEN SATURATION: 95 %

## 2023-02-20 DIAGNOSIS — Z12.5 PROSTATE CANCER SCREENING: ICD-10-CM

## 2023-02-20 DIAGNOSIS — N32.81 OAB (OVERACTIVE BLADDER): Primary | ICD-10-CM

## 2023-02-20 LAB — POST-VOID RESIDUAL VOLUME, ML POC: 23 ML

## 2023-02-20 RX ORDER — MELATONIN
1000 DAILY
COMMUNITY

## 2023-02-20 NOTE — PATIENT INSTRUCTIONS
I would recommend researching information regarding pelvic floor physical therapy to help with your urinary urgency

## 2023-02-20 NOTE — PROGRESS NOTES
2/20/2023    Chief Complaint   Patient presents with   • Follow-up     Follow up for urinary frequency  States he was a patient for [facility] but this is a closer location  Still having frequency and urgency, incontinence if not quick enough to void  Assessment and Plan    69 y o  male     1  Urinary frequency  · Slight worsening of urinary frequency and urgency since last office exam and continues to take trospium 20 mg BID  · PVR today is 23 mL  · NAOMI reveals mild prostate hypertrophy with estimated gland size of 30-35 g   · He did discuss options of trialing a different anticholinergic with an extended release tablet versus Myrbetriq 25 mg daily versus Flomax 0 4 mg daily  As his symptoms are not currently overly bothersome to him he wishes to continue with trospium 20 mg twice daily  He will contact our office should his symptoms worsen and he wishes to try something different  · Follow-up 1 year     2  Prostate cancer screening  · Negative family history of prostate cancer  · NAOMI reveals a smooth symmetric prostate, no palpable nodules or masses  · He reports that his PCP Dr Dorothy Solis does annual prostate cancer screening with a PSA  I am unable to view these through [facility] and we will request these records prior to him going for a another PSA  · Follow-up in 1 year pending review of records  Subjective:    History of Present Illness  Rani Pinto is a 69 y o  male here for follow-up evaluation of urinary frequency  He was previously seen by Dr Sabrina Rodriguez in the past and was last seen in November of 2021  He has a history of epididymitis and OAB  He denies any issues with recurrent epididymitis and his urine testing today is negative for infection  In regard to his OAB he was previously tried on oxybutynin 5 mg TID PRN but reports significant side effects of dry mouth, dry eyes, and gait disturbance  He was then switched to trospium 20 mg BID in addition to TXU [place]ito  On average he reports urinary frequency every 2-3 hours during the day and reports rare nocturia  He reports his urinary urgency has significantly worsened since his last office visit and will occasional experience urge incontinence  He reports that his urinary stream has changes in that he has an initial strong stream, but by the end of his stream it becomes weaker  He feels as though he is emptying his bladder completely  He primarily drinks a 12 oz cup of coffee in the morning which makes his urgency worse  Otherwise drinks mostly water and a couple of cans per week  Review of Systems   Constitutional: Negative for chills and fever  HENT: Negative for congestion and sore throat  Respiratory: Negative for cough and shortness of breath  Cardiovascular: Negative for chest pain and leg swelling  Gastrointestinal: Negative for abdominal pain, constipation, diarrhea, nausea and vomiting  Genitourinary: Positive for frequency and urgency  Negative for difficulty urinating, dysuria and hematuria  Musculoskeletal: Negative for back pain and gait problem  Skin: Negative for wound  Allergic/Immunologic: Negative for immunocompromised state  Neurological: Negative for dizziness, weakness and numbness  Hematological: Does not bruise/bleed easily  Vitals  Vitals:    02/20/23 1428   BP: 152/80   BP Location: Left arm   Patient Position: Sitting   Cuff Size: Large   Pulse: 84   Temp: 98 3 °F (36 8 °C)   SpO2: 95%   Weight: 112 kg (246 lb)   Height: 5' 10" (1 778 m)       Physical Exam  Vitals reviewed  Constitutional:       General: He is not in acute distress  Appearance: Normal appearance  He is not ill-appearing or toxic-appearing  HENT:      Head: Normocephalic and atraumatic  Eyes:      General: No scleral icterus  Conjunctiva/sclera: Conjunctivae normal    Cardiovascular:      Rate and Rhythm: Normal rate     Pulmonary:      Effort: Pulmonary effort is normal  No respiratory distress  Abdominal:      Tenderness: There is no right CVA tenderness or left CVA tenderness  Hernia: No hernia is present  Genitourinary:     Comments: NAOMI reveals a smooth symmetric prostate, no palpable nodules or masses  Estimated gland size between 30 to 35 g  Musculoskeletal:      Cervical back: Normal range of motion  Right lower leg: No edema  Left lower leg: No edema  Skin:     General: Skin is warm and dry  Coloration: Skin is not jaundiced or pale  Neurological:      General: No focal deficit present  Mental Status: He is alert and oriented to person, place, and time  Mental status is at baseline  Gait: Gait normal    Psychiatric:         Mood and Affect: Mood normal          Behavior: Behavior normal          Thought Content: Thought content normal          Judgment: Judgment normal          Past History  Past Medical History:   Diagnosis Date   • High cholesterol    • Leg pain      Social History     Socioeconomic History   • Marital status: /Civil Union     Spouse name: None   • Number of children: None   • Years of education: None   • Highest education level: None   Occupational History   • None   Tobacco Use   • Smoking status: Never   • Smokeless tobacco: Never   Vaping Use   • Vaping Use: Never used   Substance and Sexual Activity   • Alcohol use:  Yes     Alcohol/week: 2 0 standard drinks     Types: 2 Cans of beer per week     Comment: 2 per week   • Drug use: Never   • Sexual activity: Yes     Partners: Female   Other Topics Concern   • None   Social History Narrative   • None     Social Determinants of Health     Financial Resource Strain: Not on file   Food Insecurity: Not on file   Transportation Needs: Not on file   Physical Activity: Not on file   Stress: Not on file   Social Connections: Not on file   Intimate Partner Violence: Not on file   Housing Stability: Not on file     Social History     Tobacco Use   Smoking Status Never   Smokeless Tobacco Never     Family History   Problem Relation Age of Onset   • Liver cancer Father    • Dementia Mother        The following portions of the patient's history were reviewed and updated as appropriate allergies, current medications, past medical history, past social history, past surgical history and problem list    Imaging:    Results  No results found for this or any previous visit (from the past 1 hour(s))  ]  No results found for: PSA  Lab Results   Component Value Date    CALCIUM 8 2 (L) 07/26/2020    K 4 4 07/26/2020    CO2 27 07/26/2020     07/26/2020    BUN 15 07/26/2020    CREATININE 1 07 07/26/2020     Lab Results   Component Value Date    WBC 6 19 07/26/2020    HGB 14 6 07/26/2020    HCT 44 7 07/26/2020    MCV 91 07/26/2020     07/26/2020       Please Note:  Voice dictation software has been used to create this document  There may be inadvertent transcriptions errors       ALMA Azul 02/20/23

## 2023-02-24 ENCOUNTER — APPOINTMENT (OUTPATIENT)
Dept: LAB | Facility: HOSPITAL | Age: 68
End: 2023-02-24

## 2023-02-24 DIAGNOSIS — Z12.5 PROSTATE CANCER SCREENING: ICD-10-CM

## 2023-02-24 LAB — PSA SERPL-MCNC: 0.3 NG/ML (ref 0–4)

## 2023-02-27 NOTE — PROGRESS NOTES
PT Evaluation     Today's date: 2023  Patient name: Haja Brooks  : 1955  MRN: [id number]  Referring provider: ALMA Wang  Dx:   Encounter Diagnosis     ICD-10-CM    1  OAB (overactive bladder)  N32 81       2  Urinary urgency  R39 15       3  Urge incontinence of urine  N39 41       4  Post-void dribbling  N39 43           Start Time: 1430  Stop Time: 1530  Total time in clinic (min): 60 minutes    Assessment  Assessment details: Haja Brooks is a 69y o  year old male who presents to pelvic health physical therapy with a primary diagnosis of urinary urgency  They have increased urgency leading to incontinence, post-void dribble, and erectile dysfunction which align with the primary diagnosis  The previously stated deficits interfere with the patient's ability to participate in sexual activities,go on walks and participate in physical activity  Educated pt about PFM anatomy and function, normal bladder physiology, healthy bladder habits, and urge deferral  Provided the patient with 3 daily voiding diaries to fill out prior to next visit to assess the patient's habits regarding fluid and voiding  The patient will benefit from skilled physical therapy services to address urinary urgency and erectile dysfunction and return to their PLOF  Impairments: abnormal coordination, abnormal gait, abnormal muscle firing, abnormal muscle tone, abnormal movement, activity intolerance, impaired physical strength, lacks appropriate home exercise program, poor posture  and poor body mechanics  Understanding of Dx/Px/POC: good   Prognosis: good    Goals  STG to be completed in 8 weeks from 2023:  1  Katty Palmer will be independent with initial home exercise program    2  Katty Palmer will demonstrate ability to contract, relax and actively lengthen PFM  3  Katty Palmer will report 50% improvement in UI     4  Jose will increase water intake to 40 oz or greater per day to dilute urine and decrease irritation to the bladder  5  Dinorah Calhoun will be independent in urge deferral strategies to delay urge until he can get to a bathroom  6  Dinorah Calhoun will participate in an internal rectal muscle assessment to ensure he is performing a kegel properly to defer his urge  LTG to be completed in 12 weeks from 2/28/2023 or upon discharge from PFPT:  1  Dinorah Calhoun will be independent with advanced home exercise program for self-management of symptoms  2  Dinorah Calhoun will demonstrate ability to appropriately activate deep core muscles with functional mobility tasks  3  Jose report 90% improvement or better in UI  4  Dinorah Calhoun will report waking zero times a night to urinate to allow them to have better sleep quality  5  Jose will urinate every 2-4 hours demonstrating successful urge deferral to return to physical activity  6  Dinorah Calhoun will increase water intake to 80 oz or greater per day to dilute urine and decrease irritation to the bladder  Plan  Patient would benefit from: skilled physical therapy  Planned modality interventions: cryotherapy, electrical stimulation/Russian stimulation, TENS and thermotherapy: hydrocollator packs  Planned therapy interventions: abdominal trunk stabilization, patient education, postural training, neuromuscular re-education, manual therapy, joint mobilization, massage, balance, balance/weight bearing training, activity modification, behavior modification, breathing training, coordination, flexibility, functional ROM exercises, home exercise program, therapeutic exercise, therapeutic activities, stretching, strengthening and self care  Frequency: 1x week  Duration in weeks: 12  Plan of Care beginning date: 2/28/2023  Plan of Care expiration date: 5/23/2023  Treatment plan discussed with: referring physician, patient and family        PT Pelvic Floor Subjective:   History of Present Illness:   Nick Chatman is a 69 y o  male who prefers he/him pronouns   Nick Chatman was referred by ALMA Gray  Bev Penn presents to pelvic health physical therapy with primary complaint of urinary urgency  Increased urgency started about 5 years ago  He started Saw Saw Palmetto (900 mg) for it  Then about 3 years ago he switched to oxybutynin which caused adverse side effects so then he switched to trospium  Trospium has worked for him and decreased his nocturia  Bev Penn reports his urgency can lead to incontinence  He reports erectile dysfunction sometimes  This causes his sexual function to be less frequent than him and his wife would like  He gets an annual physical each year  Recommended that they look at his cardiac function due to its connection with ED  He says that his gait feels off and his posture is not as good as he would like  He also thinks he has a leg length discrepancy where his left leg is shorter than his right based on how his pants wear while he walks  He states that his urine velocity has changed as well  It starts off strong then gets weaker by the end  Information per Urology note with ALMA Gray on 2/20/2023:  He was previously seen by Dr Lisa Sheldon in the past and was last seen in November of 2021  He has a history of epididymitis and OAB  He denies any issues with recurrent epididymitis and his urine testing today is negative for infection  In regard to his OAB he was previously tried on oxybutynin 5 mg TID PRN but reports significant side effects of dry mouth, dry eyes, and gait disturbance  He was then switched to trospium 20 mg BID in addition to TXU [place]ito  On average he reports urinary frequency every 2-3 hours during the day and reports rare nocturia  He reports his urinary urgency has significantly worsened since his last office visit and will occasional experience urge incontinence  He reports that his urinary stream has changes in that he has an initial strong stream, but by the end of his stream it becomes weaker   He feels as though he is emptying his bladder completely  He primarily drinks a 12 oz cup of coffee in the morning which makes his urgency worse  Otherwise drinks mostly water and a couple of cans per week     Ivone Mems of life: good    Social Support:     Lives in:  Multiple-level home    Lives with:  Spouse    Relationship status: /committed    Work status: retired    Life stress severity: mild  Hand dominance:  Right  Diet and Exercise:    Diet:balanced nutrition    Exercise type: no activity    Gardening and outdoor activities in the warm weather  Bladder Function:     Voiding Difficulties positive for: urgency (Sit to pee), frequent urination and incomplete emptying      Voiding Difficulties negative for: hesitancy and straining      Voiding Difficulties comments:     Voiding frequency: every 1-2 hours and every 3-4 hours (Increased frequency in the morning while drinking coffee)    Urinary leakage: urine leakage    Urinary leakage aggravated by: standing up (Transitioning from laying to sitting upright), walking to the bathroom, hearing running water, key-in-the-door syndrome, seeing a toilet and post-void dribble    Urinary leakage not aggravated by: coughing and sneezing    Nocturia (episodes per night): 1 and 0    Painful urination: No      Intake (ounces):      Intake (ounces) comment: Coffee with creamer - 14 oz/day   Water - 20 oz   Some Juice - 1x/week  Milk in cereal    Alcohol - 2-3 beers a week    Incontinence Management:     Pads/Diaper Use:  None    Pads/Diapers Additional Comments: Change clothes once every 2 months due to leakage  Bowel Function:     Voiding DIfficulties: unfinished feeling after defecating      Bowel Function comments:  Take probiotics    Bowel frequency: daily    Chelan Stool Scale: type 4    Stool softener use: no stool softeners    Uses "squatty potty": no Squatty Potty  Sexual Function:     Sexually Active:  Sexually active    Pain during intercourse: No      Lubrication Use: Yes  Pain:     No pain reported by patient    Diagnostic Tests:     None    Treatments:     Previous treatment:  Medication  Patient Goals:     Patient goals for therapy:  Improved bladder or bowel function, improved comfort, improved quality of life, improved sleep and fully empty bladder or bowels    Other patient goals:  "improve sexual life, improve bladder function, improve quality of life"       Objective           Precautions: standard       2/28/2023          Visit Number: #1          Patient Ed           PFM anatomy and function [facility]          Bladder Physiology [facility], handout provided          Urinary urgency and urge deferral  [facility], handout provided          Bladder Diary  [facility], Handout                                            Neuro Re-Ed           DB with core           STS with TrA                                                                  Ther Ex           Warm-up            PFM contraction with ADD           PFM contraction with ABD                                                                  Ther Activity                                 Manual           Internal PFM assessment                                                       Modalities

## 2023-02-28 ENCOUNTER — EVALUATION (OUTPATIENT)
Age: 68
End: 2023-02-28

## 2023-02-28 DIAGNOSIS — R39.15 URINARY URGENCY: ICD-10-CM

## 2023-02-28 DIAGNOSIS — N39.43 POST-VOID DRIBBLING: ICD-10-CM

## 2023-02-28 DIAGNOSIS — N39.41 URGE INCONTINENCE OF URINE: ICD-10-CM

## 2023-02-28 DIAGNOSIS — N32.81 OAB (OVERACTIVE BLADDER): Primary | ICD-10-CM

## 2023-03-03 NOTE — PROGRESS NOTES
Daily Note     Today's date: 3/8/2023  Patient name: Constantino Langston  : 1955  MRN: [id number]  Referring provider: ALMA Hernandez  Dx:   Encounter Diagnosis     ICD-10-CM    1  OAB (overactive bladder)  N32 81       2  Urinary urgency  R39 15       3  Urge incontinence of urine  N39 41       4  Post-void dribbling  N39 43           Start Time: 1130  Stop Time: 1230  Total time in clinic (min): 60 minutes    Subjective: Pt reports thinking about his bladder more because of doing the bladder diary  Pt continues to have urinary urgency, urinary incontinence, and post-void dribble when standing  Objective: See treatment diary below    Pelvic Floor Muscle Control  Pelvic Floor Muscle Contraction: unable to Isolate; general substitution  PERF-Power Right: 3/5  PERF-Power Left: 3/5  PERF-Endurance:  5 seconds  PERF-Reps (# to fatigue): 3  PERF- Flicks: 5  Cough reflex: absent; when cued to contract pelvic floor then cough, pt was able to do it  Sphincter tone: decreased   Observation: hemorrhoids at external anal sphincter  Comments: when asked to contract pelvic floor muscles, pt beared down  When cued to "hold back gas", pt performed proper contraction  Assessment: Constantino Langston tolerated treatment well  Performed intrarectal muscle assessment  Pt had impaired coordination, decreased endurance and strength of pelvic floor, and decreased tone in anal sphincter  Provided patient with education on urge deferral and HEP to improved coordination and strength/endurance of PFM  Discussed bladder diary and fluid intake with pt, recommended to increase water intake to dilute urine and decrease bladder irritation  Constantino Langston will continue to benefit from skilled OPPT to address OAB, urinary urgency, urge incontinence, and PVD  Plan: Continue per plan of care  Progress treatment as tolerated  Precautions: standard    Access Code: [email or web address]  URL: [email or web address]: 03/08/2023Prepared by: Bryn Hence  ExercisesSeated Pelvic Floor Contraction - 2 x daily - 2 sets - 10 repsSit to Stand with Core activation - 1 x daily - 1 sets - 10 reps     2/28/2023 3/8/2023         Visit Number: #1          Patient Ed           PFM anatomy and function [facility]          Bladder Physiology [facility], handout provided          Urinary urgency [facility], handout provided          Bladder Diary  [facility], Handout  Discussed fluid intake (amount/type), increasing water intake over other fluid; encourage 2-4 hour void schedule; discussed >20s urination         Urge deferral  [facility], handout provided                                Neuro Re-Ed           DB with core           STS with PFM  HEP                                                                 Ther Ex           Warm-up            PFM contraction with ADD           PFM contraction with ABD           PFM contractions   HEP                                                      Ther Activity                                 Manual           Internal PFM assessment  15 min                                                     Modalities

## 2023-03-08 ENCOUNTER — OFFICE VISIT (OUTPATIENT)
Age: 68
End: 2023-03-08

## 2023-03-08 DIAGNOSIS — R39.15 URINARY URGENCY: ICD-10-CM

## 2023-03-08 DIAGNOSIS — N39.41 URGE INCONTINENCE OF URINE: ICD-10-CM

## 2023-03-08 DIAGNOSIS — N32.81 OAB (OVERACTIVE BLADDER): Primary | ICD-10-CM

## 2023-03-08 DIAGNOSIS — N39.43 POST-VOID DRIBBLING: ICD-10-CM

## 2023-03-09 NOTE — PROGRESS NOTES
Daily Note     Today's date: 3/15/2023  Patient name: Valdemar Ortiz  : 1955  MRN: [id number]  Referring provider: ALMA Peck  Dx:   Encounter Diagnosis     ICD-10-CM    1  OAB (overactive bladder)  N32 81       2  Urinary urgency  R39 15       3  Urge incontinence of urine  N39 41       4  Post-void dribbling  N39 43           Start Time: 1330  Stop Time: 1425  Total time in clinic (min): 55 minutes    Subjective: Pt reports that he noticed swelling on one testicle on Friday last week  He did have work up including blood work and [address]  All has come back ok so far  Is getting US of kidneys and bladder next Wednesday  In regards to PT he decreased coffee down to 1 cup and drinks it is helping his symptoms  He switched tea to an herbal tea and is having it earlier in the evening  He has been doing his PFM HEP  He has been practicing urge control - he does feel this is helping  He gets a little leakage after voiding   (recommend milking urethra and then PFM contractions)  History of Present Illness:   Valdemar Ortiz is a 69 y o  male who prefers he/him pronouns  Valdemar Ortiz was referred by ALMA Izaguirre  Valdemar Ortiz presents to pelvic health physical therapy with primary complaint of urinary urgency  Increased urgency started about 5 years ago  He started Saw Saw Palmetto (900 mg) for it  Then about 3 years ago he switched to oxybutynin which caused adverse side effects so then he switched to trospium  Trospium has worked for him and decreased his nocturia  Valdemar Ortiz reports his urgency can lead to incontinence  He reports erectile dysfunction sometimes  This causes his sexual function to be less frequent than him and his wife would like  He gets an annual physical each year  Recommended that they look at his cardiac function due to its connection with ED  He says that his gait feels off and his posture is not as good as he would like   He also thinks he has a leg length discrepancy where his left leg is shorter than his right based on how his pants wear while he walks  He states that his urine velocity has changed as well  It starts off strong then gets weaker by the end  Goals  STG to be completed in 8 weeks from 2/28/2023:  1  Clifford Anderson will be independent with initial home exercise program    2  Clifford Anderson will demonstrate ability to contract, relax and actively lengthen PFM  3  Clifford Anderson will report 50% improvement in UI  4  Jose will increase water intake to 40 oz or greater per day to dilute urine and decrease irritation to the bladder  5  Clifford Anderson will be independent in urge deferral strategies to delay urge until he can get to a bathroom  6  Clifford Anderson will participate in an internal rectal muscle assessment to ensure he is performing a kegel properly to defer his urge  LTG to be completed in 12 weeks from 2/28/2023 or upon discharge from PFPT:  1  Clifford Anderson will be independent with advanced home exercise program for self-management of symptoms  2  Clifford Anderson will demonstrate ability to appropriately activate deep core muscles with functional mobility tasks  3  Jose report 90% improvement or better in UI  4  Clifford Anderson will report waking zero times a night to urinate to allow them to have better sleep quality  5  Jose will urinate every 2-4 hours demonstrating successful urge deferral to return to physical activity  6  Clifford Anderson will increase water intake to 80 oz or greater per day to dilute urine and decrease irritation to the bladder  Objective: See treatment diary below      Assessment: Denise Coles tolerated today's treatment session well  Patient education provided on urge deferral, pressure management and PFM strengthenign TE  Clifford Anderson completed all TE with good form and no adverse reactions  Much focus on correct breathing pattern with STS activity    Clifford Anderson continues to benefit from skilled OPPT services to address urinary urgency and incontinencne   Will continue to address functional deficits and focus on progression of POC per patient tolerance  Plan: Continue per plan of care  Progress treatment as tolerated  Precautions: standard    Access Code: [email or web address]  URL: [email or web address]  Date: 03/15/2023  Prepared by: Marialuisa Erps    Exercises  Seated Pelvic Floor Contraction - 2 x daily - 2 sets - 10 reps  Sit to Stand with Core activation - 1 x daily - 1 sets - 10 reps  Seated Pelvic Floor Contraction with Isometric Hip Adduction - 1 x daily - 1 sets - 10 reps  Seated Pelvic Floor Contraction with Hip Abduction and Resistance Loop - 1 x daily - 1 sets - 10 reps  Step Tap with Pelvic Floor Contraction - 2 x daily - 2 sets - 10 reps  Heel Raise with Pelvic Floor Contraction - 1 x daily - 1 sets - 10 reps  Mini Squat with Pelvic Floor Contraction - 2 x daily - 1 sets - 10 reps         2/28/2023 3/8/2023 3/15/2023        Visit Number: #1 #2 #3        Patient Ed           PFM anatomy and function [facility]          Bladder Physiology [facility], handout provided          Urinary urgency [facility], handout provided          Bladder Diary  [facility], Handout  Discussed fluid intake (amount/type), increasing water intake over other fluid; encourage 2-4 hour void schedule; discussed >20s urination reviewed        Urge deferral  [facility], handout provided  Reviewed                               Neuro Re-Ed           DB with core           STS with PFM  HEP  5 min         Step tap with PFM    2 min 4" step         HR with PFM and coordination of breathing   x10         Mini squats with PFM and coordination of breathing    x10                               Ther Ex           Warm-up            PFM contraction with ADD    5" hold with 5" rest 2 min        PFM contraction with ABD   5" hold 5" rest x 2 min         PFM contractions   HEP                                                      Ther Activity                                 Manual           Internal PFM assessment  15 min                                                     Modalities

## 2023-03-13 ENCOUNTER — TELEPHONE (OUTPATIENT)
Dept: UROLOGY | Facility: AMBULATORY SURGERY CENTER | Age: 68
End: 2023-03-13

## 2023-03-13 DIAGNOSIS — N45.1 EPIDIDYMITIS, LEFT: Primary | ICD-10-CM

## 2023-03-13 DIAGNOSIS — N50.819 PAIN IN TESTICLE, UNSPECIFIED LATERALITY: Primary | ICD-10-CM

## 2023-03-13 DIAGNOSIS — Z87.438 H/O EPIDIDYMITIS: ICD-10-CM

## 2023-03-13 RX ORDER — DOXYCYCLINE HYCLATE 100 MG/1
100 CAPSULE ORAL EVERY 12 HOURS SCHEDULED
Qty: 28 CAPSULE | Refills: 0 | Status: SHIPPED | OUTPATIENT
Start: 2023-03-13 | End: 2023-03-27

## 2023-03-13 NOTE — TELEPHONE ENCOUNTER
Patient has a history of epididymitis in the past   I recommend urine testing as well as a stat ultrasound of scrotum and testicles  I will send antibiotics to his pharmacy empiric treatment of suspected epididymitis

## 2023-03-13 NOTE — TELEPHONE ENCOUNTER
Pt is under the care of David Salmon and was last seen 02/20/23    Pt states that on Friday he noticed that his right testicle is enlarged and feels "mushy" and has a lump near the top which was treated before with an antibiotic about 3 years ago  He does have a family history of testicle cancer       Please review and advice     Pt can be reached at [phone]

## 2023-03-13 NOTE — TELEPHONE ENCOUNTER
Called and spoke with pt report having testicle swelling, R is very swollen  Does reports some discomfort to touch or when sitting to long  No fever or chills, no nausea/vomting  Denies any issues with urination right now, doing his PT for this  Pt unsure what to do   Please advise

## 2023-03-13 NOTE — TELEPHONE ENCOUNTER
Pt scheduled for STAT US today at 4:30, pt unable to make that time d/t has another appt  Provided phone number for central scheduling to call & reschedule  Pt verbalized understanding  No further questions  Aware of Tomás message below

## 2023-03-14 ENCOUNTER — HOSPITAL ENCOUNTER (OUTPATIENT)
Dept: ULTRASOUND IMAGING | Facility: HOSPITAL | Age: 68
Discharge: HOME | End: 2023-03-14

## 2023-03-14 ENCOUNTER — LAB (OUTPATIENT)
Dept: LAB | Facility: HOSPITAL | Age: 68
End: 2023-03-14

## 2023-03-14 DIAGNOSIS — Z87.438 H/O EPIDIDYMITIS: ICD-10-CM

## 2023-03-14 DIAGNOSIS — I86.1 VARICOCELE: ICD-10-CM

## 2023-03-14 DIAGNOSIS — N50.819 PAIN IN TESTICLE, UNSPECIFIED LATERALITY: ICD-10-CM

## 2023-03-14 DIAGNOSIS — N50.819 PAIN IN TESTICLE, UNSPECIFIED LATERALITY: Primary | ICD-10-CM

## 2023-03-14 LAB
BILIRUB UR QL STRIP: NEGATIVE
CLARITY UR: CLEAR
COLOR UR: YELLOW
GLUCOSE UR STRIP-MCNC: NEGATIVE MG/DL
HGB UR QL STRIP.AUTO: NEGATIVE
KETONES UR STRIP-MCNC: NEGATIVE MG/DL
LEUKOCYTE ESTERASE UR QL STRIP: NEGATIVE
NITRITE UR QL STRIP: NEGATIVE
PH UR STRIP.AUTO: 7 [PH]
PROT UR STRIP-MCNC: NEGATIVE MG/DL
SP GR UR STRIP.AUTO: 1.01 (ref 1–1.03)
UROBILINOGEN UR QL STRIP.AUTO: 0.2 E.U./DL

## 2023-03-14 NOTE — RESULT ENCOUNTER NOTE
Results were reviewed with patient on the phone  Recommendations are for a ultrasound of the abdomen to rule out possible compressing mass  Patient was provided central scheduling number and will call to schedule this

## 2023-03-15 ENCOUNTER — OFFICE VISIT (OUTPATIENT)
Age: 68
End: 2023-03-15

## 2023-03-15 DIAGNOSIS — N39.43 POST-VOID DRIBBLING: ICD-10-CM

## 2023-03-15 DIAGNOSIS — N39.41 URGE INCONTINENCE OF URINE: ICD-10-CM

## 2023-03-15 DIAGNOSIS — N32.81 OAB (OVERACTIVE BLADDER): Primary | ICD-10-CM

## 2023-03-15 DIAGNOSIS — R39.15 URINARY URGENCY: ICD-10-CM

## 2023-03-15 LAB — BACTERIA UR CULT: NORMAL

## 2023-03-15 NOTE — RESULT ENCOUNTER NOTE
Please the patient know his urine culture was negative  He can discontinue doxycycline as ultrasound scrotum and testicles did not show any signs of epididymitis and urine culture is negative

## 2023-03-19 NOTE — PROGRESS NOTES
Daily Note     Today's date: 3/19/2023  Patient name: Rock Pearce  : 1955  MRN: [id number]  Referring provider: ALMA Ramirez  Dx:   Encounter Diagnosis     ICD-10-CM    1  OAB (overactive bladder)  N32 81       2  Urinary urgency  R39 15       3  Post-void dribbling  N39 43       4  Urge incontinence of urine  N39 41                      Subjective: ***    Goals  STG to be completed in 8 weeks from 2023:  1  Carmenza Coto will be independent with initial home exercise program    2  Carmenza Coto will demonstrate ability to contract, relax and actively lengthen PFM  3  Carmenza Coto will report 50% improvement in UI  4  Jose will increase water intake to 40 oz or greater per day to dilute urine and decrease irritation to the bladder  5  Carmenza Coto will be independent in urge deferral strategies to delay urge until he can get to a bathroom  6  Carmenza Coto will participate in an internal rectal muscle assessment to ensure he is performing a kegel properly to defer his urge  LTG to be completed in 12 weeks from 2023 or upon discharge from PFPT:  1  Carmenza Coto will be independent with advanced home exercise program for self-management of symptoms  2  Carmenza Coto will demonstrate ability to appropriately activate deep core muscles with functional mobility tasks  3  Jose report 90% improvement or better in UI  4  Carmenza Coto will report waking zero times a night to urinate to allow them to have better sleep quality  5  Jose will urinate every 2-4 hours demonstrating successful urge deferral to return to physical activity  6  Carmenza Coto will increase water intake to 80 oz or greater per day to dilute urine and decrease irritation to the bladder  Objective: See treatment diary below      Assessment: Rock Pearce tolerated today's treatment session well  Patient education provided on [facility] education options:01524}  Carmenza Coto completed all TE with good form and no adverse reactions   ***  Jose "continues to benefit from skilled OPPT services to address {kodytoms:57150}  Will continue to address functional deficits and focus on progression of POC per patient tolerance  Patient performed {KINGSAerobic:81393} to increase blood flow to the area being treated, prepare the muscles for strength training and stretching, improve overall tolerance to activity, and aerobic endurance  Plan: Continue per plan of care  Progress treatment as tolerated  Precautions: standard    Access Code: [email or web address]  URL: [email or web address]  Date: 03/15/2023  Prepared by: Kimberli Leblanc  Seated Pelvic Floor Contraction - 2 x daily - 2 sets - 10 reps  Sit to Stand with Core activation - 1 x daily - 1 sets - 10 reps  Seated Pelvic Floor Contraction with Isometric Hip Adduction - 1 x daily - 1 sets - 10 reps  Seated Pelvic Floor Contraction with Hip Abduction and Resistance Loop - 1 x daily - 1 sets - 10 reps  Step Tap with Pelvic Floor Contraction - 2 x daily - 2 sets - 10 reps  Heel Raise with Pelvic Floor Contraction - 1 x daily - 1 sets - 10 reps  Mini Squat with Pelvic Floor Contraction - 2 x daily - 1 sets - 10 reps         2/28/2023 3/8/2023 3/15/2023 3/22/2023       Visit Number: #1 #2 #3 #4       Patient Ed           PFM anatomy and function [facility]          Bladder Physiology [facility], handout provided          Urinary urgency [facility], handout provided          Bladder Diary  [facility], Handout  Discussed fluid intake (amount/type), increasing water intake over other fluid; encourage 2-4 hour void schedule; discussed >20s urination reviewed        Urge deferral  [facility], handout provided  Reviewed                               Neuro Re-Ed           DB with core           STS with PFM  HEP  5 min         Step tap with PFM    2 min 4\" step         HR with PFM and coordination of breathing   x10         Mini squats with PFM and coordination of breathing    x10                               Ther Ex         " "  Warm-up            PFM contraction with ADD    5\" hold with 5\" rest 2 min        PFM contraction with ABD   5\" hold 5\" rest x 2 min         PFM contractions   HEP                                                      Ther Activity                                 Manual           Internal PFM assessment  15 min                                                     Modalities                                               "

## 2023-03-22 ENCOUNTER — APPOINTMENT (OUTPATIENT)
Age: 68
End: 2023-03-22

## 2023-03-22 ENCOUNTER — HOSPITAL ENCOUNTER (OUTPATIENT)
Dept: ULTRASOUND IMAGING | Facility: HOSPITAL | Age: 68
Discharge: HOME | End: 2023-03-22

## 2023-03-22 DIAGNOSIS — N32.81 OAB (OVERACTIVE BLADDER): Primary | ICD-10-CM

## 2023-03-22 DIAGNOSIS — N39.41 URGE INCONTINENCE OF URINE: ICD-10-CM

## 2023-03-22 DIAGNOSIS — N39.43 POST-VOID DRIBBLING: ICD-10-CM

## 2023-03-22 DIAGNOSIS — N50.819 PAIN IN TESTICLE, UNSPECIFIED LATERALITY: ICD-10-CM

## 2023-03-22 DIAGNOSIS — I86.1 VARICOCELE: ICD-10-CM

## 2023-03-22 DIAGNOSIS — R39.15 URINARY URGENCY: ICD-10-CM

## 2023-03-26 NOTE — PROGRESS NOTES
Daily Note/Discharge Summary     Today's date: 3/29/2023  Patient name: Boris Frank  : 1955  MRN: [id number]  Referring provider: Geofm Guardian, CRNP  Dx:   Encounter Diagnosis     ICD-10-CM    1  OAB (overactive bladder)  N32 81       2  Urinary urgency  R39 15       3  Urge incontinence of urine  N39 41       4  Post-void dribbling  N39 43           Start Time: 1430  Stop Time: 1520  Total time in clinic (min): 50 minutes    Subjective: Pt reports he has not been consistent with HEP  He has been experimenting with some things  He eliminated coffee entirely and realized how much coffee is a trigger for him  He feels symptoms are improving  Goals  STG to be completed in 8 weeks from 2023:  1  Mello Cheek will be independent with initial home exercise program  MET 3/29/2023   2  Mello Cheek will demonstrate ability to contract, relax and actively lengthen PFM  MET 3/29/2023   3  Mello Cheek will report 50% improvement in UI  MET 3/29/2023   4  Mello Cheek will increase water intake to 40 oz or greater per day to dilute urine and decrease irritation to the bladder  MET 3/29/2023   5  Mello Cheek will be independent in urge deferral strategies to delay urge until he can get to a bathroom  MET 3/29/2023   6  Mello Cheek will participate in an internal rectal muscle assessment to ensure he is performing a kegel properly to defer his urge  MET 3/29/2023     LTG to be completed in 12 weeks from 2023 or upon discharge from PFPT:  1  Mello Cheek will be independent with advanced home exercise program for self-management of symptoms  MET 3/29/2023   2  Mello Cheek will demonstrate ability to appropriately activate deep core muscles with functional mobility tasks  MET 3/29/2023  3  Jose report 90% improvement or better in UI  MET 3/29/2023   4  Mello Cheek will report waking zero times a night to urinate to allow them to have better sleep quality  MET 3/29/2023    5   Mello Cheek will urinate every 2-4 hours demonstrating successful urge deferral to return to physical activity  MET 3/29/2023   6  Yelitza Mcgowan will increase water intake to 80 oz or greater per day to dilute urine and decrease irritation to the bladder  MET 3/29/2023     Objective: See treatment diary below      Assessment: Vickie Harding has had improvement in symptoms since starting PFPT  Vickie Harding has decreased urinary frequency, nocturia and UI and is beginning to increase her activity level without symptoms  He has changed fluid intake patterns and recognizes coffee as a trigger for bladder symptoms  He has demonstrated improvements in pressure management  Vickie Harding is independent with managing symptoms at home and is appropriate for discharge at this time  Vickie Harding agreeable to this  Vickie Harding encouraged to call this office if they has any questions  Patient performed Treadmill to increase blood flow to the area being treated, prepare the muscles for strength training and stretching, improve overall tolerance to activity, and aerobic endurance  Plan: Continue per plan of care  Progress treatment as tolerated  Precautions: standard    Access Code: [email or web address]  URL: [email or web address]  Date: 03/15/2023  Prepared by: Anthony Mccullough    Exercises  Seated Pelvic Floor Contraction - 2 x daily - 2 sets - 10 reps  Sit to Stand with Core activation - 1 x daily - 1 sets - 10 reps  Seated Pelvic Floor Contraction with Isometric Hip Adduction - 1 x daily - 1 sets - 10 reps  Seated Pelvic Floor Contraction with Hip Abduction and Resistance Loop - 1 x daily - 1 sets - 10 reps  Step Tap with Pelvic Floor Contraction - 2 x daily - 2 sets - 10 reps  Heel Raise with Pelvic Floor Contraction - 1 x daily - 1 sets - 10 reps  Mini Squat with Pelvic Floor Contraction - 2 x daily - 1 sets - 10 reps       2/28/2023 3/8/2023 3/15/2023 3/29/2023       Visit Number: #1 #2 #3 #4 #5      Patient Ed           PFM anatomy and function [address] [place] [address] Drive "  Bladder Physiology [address], handout provided          Urinary urgency [address], handout provided          Bladder Diary  [address], Handout  Discussed fluid intake (amount/type), increasing water intake over other fluid; encourage 2-4 hour void schedule; discussed >20s urination reviewed        Urge deferral  [address], handout provided  Reviewed                               Neuro Re-Ed           DB with core           STS with PFM  HEP  5 min  x10 (max cues for coordination of breath and PFM with functional movement)       Step tap with PFM    2 min 4\" step  Full step up 4\" step x20        HR with PFM and coordination of breathing   x10  x10        Mini squats with PFM and coordination of breathing    x10  x10       Lifting with PFM    x10 (7 5# KB)       Push/pull with PFM     x3 with cues for push/pull to simulate wheelbarrow use                   Ther Ex           Warm-up     TM 5 min        PFM contraction with ADD    5\" hold with 5\" rest 2 min 5\" hold with 5\" rest 2 min        PFM contraction with ABD   5\" hold 5\" rest x 2 min  BTB 5\" hold, 5\" rest 2 min        PFM contractions   HEP                                                      Ther Activity                                 Manual           Internal PFM assessment  15 min                                                     Modalities                                                 "

## 2023-03-29 ENCOUNTER — OFFICE VISIT (OUTPATIENT)
Age: 68
End: 2023-03-29

## 2023-03-29 DIAGNOSIS — N39.43 POST-VOID DRIBBLING: ICD-10-CM

## 2023-03-29 DIAGNOSIS — N39.41 URGE INCONTINENCE OF URINE: ICD-10-CM

## 2023-03-29 DIAGNOSIS — R39.15 URINARY URGENCY: ICD-10-CM

## 2023-03-29 DIAGNOSIS — N32.81 OAB (OVERACTIVE BLADDER): Primary | ICD-10-CM

## 2023-03-29 NOTE — RESULT ENCOUNTER NOTE
Please let patient know that his ultrasound kidney and bladder did not show any definitive mass within the abdomen or pelvis  This was limited and if patient is continuing experience symptoms I would recommend we check a CT abdomen pelvis with contrast for more definitive evaluation

## 2023-11-28 DIAGNOSIS — N32.81 OAB (OVERACTIVE BLADDER): ICD-10-CM

## 2023-11-28 RX ORDER — TROSPIUM CHLORIDE 20 MG/1
20 TABLET, FILM COATED ORAL 2 TIMES DAILY
Qty: 180 TABLET | Refills: 0 | Status: SHIPPED | OUTPATIENT
Start: 2023-11-28

## 2023-11-28 NOTE — TELEPHONE ENCOUNTER
Reason for call:   [x] Refill   [] Prior Auth  [] Other:     Office:   [] PCP/Provider -   [x] Specialty/Provider - Cheri Jc URO     Medication: Trospium     Dose/Frequency: 20mg BID     Quantity: 180    Pharmacy: [facility] Mail order    Does the patient have enough for 3 days?    [x] Yes   [] No - Send as HP to POD

## 2024-02-22 DIAGNOSIS — N32.81 OAB (OVERACTIVE BLADDER): ICD-10-CM

## 2024-02-22 RX ORDER — TROSPIUM CHLORIDE 20 MG/1
20 TABLET, FILM COATED ORAL 2 TIMES DAILY
Qty: 180 TABLET | Refills: 0 | Status: SHIPPED | OUTPATIENT
Start: 2024-02-22

## 2024-04-09 ENCOUNTER — HOSPITAL ENCOUNTER (EMERGENCY)
Facility: HOSPITAL | Age: 69
Discharge: HOME | End: 2024-04-09
Attending: EMERGENCY MEDICINE
Payer: COMMERCIAL

## 2024-04-09 ENCOUNTER — APPOINTMENT (EMERGENCY)
Dept: RADIOLOGY | Facility: HOSPITAL | Age: 69
End: 2024-04-09
Payer: COMMERCIAL

## 2024-04-09 VITALS
TEMPERATURE: 98.2 F | RESPIRATION RATE: 18 BRPM | HEART RATE: 98 BPM | OXYGEN SATURATION: 96 % | SYSTOLIC BLOOD PRESSURE: 183 MMHG | DIASTOLIC BLOOD PRESSURE: 99 MMHG

## 2024-04-09 DIAGNOSIS — S91.332A PUNCTURE WOUND OF LEFT FOOT, INITIAL ENCOUNTER: Primary | ICD-10-CM

## 2024-04-09 PROCEDURE — 90715 TDAP VACCINE 7 YRS/> IM: CPT | Performed by: EMERGENCY MEDICINE

## 2024-04-09 PROCEDURE — 73630 X-RAY EXAM OF FOOT: CPT

## 2024-04-09 RX ORDER — LEVOFLOXACIN 750 MG/1
750 TABLET, FILM COATED ORAL EVERY 24 HOURS
Qty: 5 TABLET | Refills: 0 | Status: SHIPPED | OUTPATIENT
Start: 2024-04-09 | End: 2024-04-14

## 2024-04-09 RX ORDER — IBUPROFEN 600 MG/1
600 TABLET ORAL ONCE
Status: COMPLETED | OUTPATIENT
Start: 2024-04-09 | End: 2024-04-09

## 2024-04-09 RX ADMIN — TETANUS TOXOID, REDUCED DIPHTHERIA TOXOID AND ACELLULAR PERTUSSIS VACCINE, ADSORBED 0.5 ML: 5; 2.5; 8; 8; 2.5 SUSPENSION INTRAMUSCULAR at 10:10

## 2024-04-09 RX ADMIN — LEVOFLOXACIN 750 MG: 500 TABLET, FILM COATED ORAL at 10:09

## 2024-04-09 RX ADMIN — IBUPROFEN 600 MG: 600 TABLET ORAL at 10:09

## 2024-04-09 NOTE — ED PROVIDER NOTES
History  Chief Complaint   Patient presents with    Foot Injury     Patient stepped on a jerel nail with left foot this morning. Patient is not UTD with tetanus       History provided by:  Medical records and patient  Injury  Location:  Left foot  Severity:  Moderate  Onset quality:  Sudden  Duration:  1 hour  Timing:  Constant  Progression:  Unchanged  Chronicity:  New  Context:  Patient was clearing out debris from behind his shed, he jumped down from a knee wall and landed on a board with a nail that punctured into his left shoe/foot  Relieved by:  Removed  Worsened by:  Nothing  Ineffective treatments:  None tried  Associated symptoms: no abdominal pain, no chest pain, no cough, no ear pain, no fatigue, no fever, no headaches, no nausea, no rash, no rhinorrhea, no shortness of breath, no sore throat and no vomiting        Prior to Admission Medications   Prescriptions Last Dose Informant Patient Reported? Taking?   Ascorbic Acid (C 500 PO)  Self Yes No   Calcium Carbonate 1500 (600 Ca) MG TABS  Self Yes No   Sig: Take by mouth   Patient not taking: Reported on 2/20/2023   Carboxymeth-Glycerin-Polysorb 0.5-1-0.5 % SOLN  Self Yes No   Sig: Apply to eye   Patient not taking: Reported on 2/20/2023   Cholecalciferol (D3 High Potency) 10 MCG (400 UNIT) TABS  Self Yes No   Patient not taking: Reported on 2/20/2023   Cholecalciferol 25 MCG (1000 UT) tablet  Self Yes No   Sig: Take 1,000 Units by mouth   Patient not taking: Reported on 2/20/2023   Coenzyme Q10 400 MG CAPS  Self Yes No   Sig: Take by mouth   Dextromethorphan-guaiFENesin (DM-guaiFENesin ER)  MG per 12 hr tablet  Self Yes No   Patient not taking: Reported on 2/20/2023   Probiotic Product (Misc Intestinal Carmel Regulat) CAPS  Self Yes No   Sig: Take by mouth   Respiratory Therapy Supplies (CareTouch CPAP & BIPAP Hose) MISC  Self Yes No   Zn-Pyg Afri-Nettle-Saw Palmet (SAW PALMETTO COMPLEX PO)  Self Yes No   Sig: Take 450 mg by mouth   aspirin (ECOTRIN  LOW STRENGTH) 81 mg EC tablet  Self Yes No   Sig: Take 81 mg by mouth   cholecalciferol (VITAMIN D3) 1,000 units tablet  Self Yes No   Sig: Take 1,000 Units by mouth daily   sildenafil (VIAGRA) 100 mg tablet  Self Yes No   Sig: Take 50 mg by mouth daily as needed for erectile dysfunction   simvastatin (ZOCOR) 40 mg tablet  Self Yes No   Sig: Take 40 mg by mouth daily   trospium chloride (SANCTURA) 20 mg tablet   No No   Sig: Take 1 tablet (20 mg total) by mouth 2 (two) times a day      Facility-Administered Medications: None       Past Medical History:   Diagnosis Date    High cholesterol     Leg pain        Past Surgical History:   Procedure Laterality Date    VASECTOMY N/A 1978       Family History   Problem Relation Age of Onset    Liver cancer Father     Dementia Mother      I have reviewed and agree with the history as documented.    E-Cigarette/Vaping    E-Cigarette Use Never User      E-Cigarette/Vaping Substances     Social History     Tobacco Use    Smoking status: Never    Smokeless tobacco: Never   Vaping Use    Vaping status: Never Used   Substance Use Topics    Alcohol use: Yes     Alcohol/week: 2.0 standard drinks of alcohol     Types: 2 Cans of beer per week     Comment: 2 per week    Drug use: Never       Review of Systems   Constitutional:  Negative for appetite change, chills, fatigue and fever.   HENT:  Negative for ear pain, rhinorrhea, sore throat and trouble swallowing.    Eyes:  Negative for pain, discharge and visual disturbance.   Respiratory:  Negative for cough, chest tightness and shortness of breath.    Cardiovascular:  Negative for chest pain and palpitations.   Gastrointestinal:  Negative for abdominal pain, nausea and vomiting.   Endocrine: Negative for polydipsia, polyphagia and polyuria.   Genitourinary:  Negative for difficulty urinating, dysuria, hematuria and testicular pain.   Musculoskeletal:  Negative for arthralgias and back pain.   Skin:  Positive for wound. Negative for  color change and rash.   Allergic/Immunologic: Negative for immunocompromised state.   Neurological:  Negative for dizziness, seizures, syncope, weakness and headaches.   Hematological:  Negative for adenopathy.   Psychiatric/Behavioral:  Negative for confusion and dysphoric mood.    All other systems reviewed and are negative.      Physical Exam  Physical Exam  Vitals and nursing note reviewed.   Constitutional:       General: He is not in acute distress.     Appearance: Normal appearance. He is not ill-appearing, toxic-appearing or diaphoretic.   HENT:      Head: Normocephalic and atraumatic.      Nose: Nose normal.   Cardiovascular:      Rate and Rhythm: Normal rate and regular rhythm.      Pulses: Normal pulses.      Heart sounds: Normal heart sounds. No murmur heard.     No gallop.      Comments: +3 DP/PT pulses left foot  Musculoskeletal:         General: Normal range of motion.      Cervical back: Normal range of motion and neck supple.   Skin:     General: Skin is warm and dry.      Capillary Refill: Capillary refill takes less than 2 seconds.      Comments: There is a 0.5 cm jagged puncture wound to the sole of the left foot.  Dorsal to the left foot is a small ecchymotic hematoma   Neurological:      General: No focal deficit present.      Mental Status: He is alert and oriented to person, place, and time.      Cranial Nerves: No cranial nerve deficit.      Sensory: No sensory deficit.      Motor: No weakness.      Coordination: Coordination normal.      Gait: Gait normal.      Deep Tendon Reflexes: Reflexes normal.   Psychiatric:         Mood and Affect: Mood normal.         Behavior: Behavior normal.         Thought Content: Thought content normal.         Judgment: Judgment normal.         Vital Signs  ED Triage Vitals   Temperature Pulse Respirations Blood Pressure SpO2   04/09/24 0922 04/09/24 0922 04/09/24 0922 04/09/24 0922 04/09/24 0922   98.2 °F (36.8 °C) 98 18 (!) 183/99 96 %      Temp Source  Heart Rate Source Patient Position - Orthostatic VS BP Location FiO2 (%)   04/09/24 0922 04/09/24 0922 04/09/24 0922 04/09/24 0922 --   Temporal Monitor Lying Left arm       Pain Score       04/09/24 1009       3           Vitals:    04/09/24 0922   BP: (!) 183/99   Pulse: 98   Patient Position - Orthostatic VS: Lying         Visual Acuity      ED Medications  Medications   levofloxacin (LEVAQUIN) tablet 750 mg (750 mg Oral Given 4/9/24 1009)   tetanus-diphtheria-acellular pertussis (BOOSTRIX) IM injection 0.5 mL (0.5 mL Intramuscular Given 4/9/24 1010)   ibuprofen (MOTRIN) tablet 600 mg (600 mg Oral Given 4/9/24 1009)       Diagnostic Studies  Results Reviewed       None                   XR foot 3+ views LEFT   Final Result by Basim Marin MD (04/09 1902)   Gas in the soft tissues of the foot with punctate radiopaque foreign bodies.   No acute osseous abnormality.      Workstation performed: [id number]                    Procedures  Procedures         ED Course                                             Medical Decision Making  0927: Patient appears well, vital signs reviewed.  Patient sustained a puncture wound to his left sole of his foot there is some hematoma and tenting at the dorsal foot.  Given these findings, plan to complete x-rays of the left foot.  The nail appears to be intact per the patient.  I will update his tetanus status.  I will prophylactically place on antibiotics due to a severe puncture wound through shoe.  I will give analgesics for his discomfort and reevaluate.  Wound to heal by secondary intention, wound care instructions given.    Amount and/or Complexity of Data Reviewed  Radiology: ordered and independent interpretation performed.     Details: Left foot x-rays--no fracture    Risk  Prescription drug management.             Disposition  Final diagnoses:   Puncture wound of left foot, initial encounter     Time reflects when diagnosis was documented in both MDM as applicable  and the Disposition within this note       Time User Action Codes Description Comment    4/9/2024 10:01 AM Jona Law Add [S91.332A] Puncture wound of left foot, initial encounter           ED Disposition       ED Disposition   Discharge    Condition   Stable    Date/Time   Tue Apr 9, 2024 10:01 AM    Comment   Jose Hopkins discharge to home/self care.                   Follow-up Information       Follow up With Specialties Details Why Contact Info Additional Information    Rico Charles MD Internal Medicine Schedule an appointment as soon as possible for a visit  As needed [address] [place] [id number]  [phone]       [facility] Emergency Department Emergency Medicine  If symptoms worsen [address] [facility] [address] [phone] [facility] Emergency Department, [address] [place], [place], [address]            Discharge Medication List as of 4/9/2024 10:02 AM        START taking these medications    Details   levofloxacin (LEVAQUIN) 750 mg tablet Take 1 tablet (750 mg total) by mouth every 24 hours for 5 days, Starting Tue 4/9/2024, Until Sun 4/14/2024, Normal           CONTINUE these medications which have NOT CHANGED    Details   Ascorbic Acid (C 500 PO) Starting Sat 1/1/2022, Historical Med      aspirin (ECOTRIN LOW STRENGTH) 81 mg EC tablet Take 81 mg by mouth, Historical Med      Calcium Carbonate 1500 (600 Ca) MG TABS Take by mouth, Historical Med      Carboxymeth-Glycerin-Polysorb 0.5-1-0.5 % SOLN Apply to eye, Historical Med      !! Cholecalciferol (D3 High Potency) 10 MCG (400 UNIT) TABS Starting Sat 1/1/2022, Historical Med      !! cholecalciferol (VITAMIN D3) 1,000 units tablet Take 1,000 Units by mouth daily, Historical Med      !! Cholecalciferol 25 MCG (1000 UT) tablet Take 1,000 Units by mouth, Historical Med      Coenzyme Q10 400 MG CAPS Take by mouth, Historical Med      Dextromethorphan-guaiFENesin (DM-guaiFENesin  ER)  MG per 12 hr tablet Starting Fri 7/23/2021, Historical Med      Probiotic Product (Misc Intestinal Carmel Regulat) CAPS Take by mouth, Historical Med      Respiratory Therapy Supplies (CareTouch CPAP & BIPAP Hose) MISC Historical Med      sildenafil (VIAGRA) 100 mg tablet Take 50 mg by mouth daily as needed for erectile dysfunction, Historical Med      simvastatin (ZOCOR) 40 mg tablet Take 40 mg by mouth daily, Starting Mon 7/12/2010, Historical Med      trospium chloride (SANCTURA) 20 mg tablet Take 1 tablet (20 mg total) by mouth 2 (two) times a day, Starting Thu 2/22/2024, Normal      Zn-Pyg Afri-Nettle-Saw Palmet (SAW PALMETTO COMPLEX PO) Take 450 mg by mouth, Historical Med       !! - Potential duplicate medications found. Please discuss with provider.          No discharge procedures on file.    PDMP Review         Value Time User    PDMP Reviewed  Yes 7/27/2020  9:55 AM Alyson Centeno MD            ED Provider  Electronically Signed by             Jona Law MD  04/11/24 0614

## 2024-04-15 RX ORDER — OMEPRAZOLE 20 MG/1
20 CAPSULE, DELAYED RELEASE ORAL DAILY
COMMUNITY

## 2024-04-15 RX ORDER — LOSARTAN POTASSIUM 50 MG/1
50 TABLET ORAL DAILY
COMMUNITY
Start: 2023-11-16

## 2024-04-15 RX ORDER — CICLOPIROX 80 MG/ML
1 SOLUTION TOPICAL
COMMUNITY
Start: 2024-03-20

## 2024-04-15 RX ORDER — AZELASTINE HYDROCHLORIDE 137 UG/1
1 SPRAY, METERED NASAL 2 TIMES DAILY
COMMUNITY

## 2024-04-15 RX ORDER — ACETAMINOPHEN 500 MG
1 TABLET ORAL DAILY
COMMUNITY

## 2024-04-15 RX ORDER — KETOCONAZOLE 20 MG/G
1 CREAM TOPICAL 2 TIMES DAILY
COMMUNITY
Start: 2024-03-20

## 2024-04-15 RX ORDER — CETIRIZINE HYDROCHLORIDE 10 MG/1
1 CAPSULE, LIQUID FILLED ORAL DAILY
COMMUNITY

## 2024-04-15 RX ORDER — FAMOTIDINE 10 MG
10 TABLET ORAL DAILY
COMMUNITY

## 2024-04-16 ENCOUNTER — OFFICE VISIT (OUTPATIENT)
Dept: UROLOGY | Facility: CLINIC | Age: 69
End: 2024-04-16
Payer: COMMERCIAL

## 2024-04-16 ENCOUNTER — APPOINTMENT (OUTPATIENT)
Dept: LAB | Facility: HOSPITAL | Age: 69
End: 2024-04-16
Payer: COMMERCIAL

## 2024-04-16 VITALS
RESPIRATION RATE: 20 BRPM | OXYGEN SATURATION: 96 % | DIASTOLIC BLOOD PRESSURE: 96 MMHG | BODY MASS INDEX: 35.22 KG/M2 | TEMPERATURE: 98.1 F | HEIGHT: 70 IN | SYSTOLIC BLOOD PRESSURE: 148 MMHG | HEART RATE: 96 BPM | WEIGHT: 246 LBS

## 2024-04-16 DIAGNOSIS — R35.1 BENIGN PROSTATIC HYPERPLASIA WITH NOCTURIA: ICD-10-CM

## 2024-04-16 DIAGNOSIS — N40.1 BENIGN PROSTATIC HYPERPLASIA WITH NOCTURIA: ICD-10-CM

## 2024-04-16 DIAGNOSIS — N32.81 OAB (OVERACTIVE BLADDER): Primary | ICD-10-CM

## 2024-04-16 LAB — PSA SERPL-MCNC: 0.35 NG/ML (ref 0–4)

## 2024-04-16 PROCEDURE — 99214 OFFICE O/P EST MOD 30 MIN: CPT | Performed by: UROLOGY

## 2024-04-16 PROCEDURE — 36415 COLL VENOUS BLD VENIPUNCTURE: CPT

## 2024-04-16 PROCEDURE — 84153 ASSAY OF PSA TOTAL: CPT

## 2024-04-16 RX ORDER — AZITHROMYCIN 250 MG/1
TABLET, FILM COATED ORAL
COMMUNITY
Start: 2024-03-21

## 2024-04-16 RX ORDER — TROSPIUM CHLORIDE 20 MG/1
20 TABLET, FILM COATED ORAL 2 TIMES DAILY
Qty: 180 TABLET | Refills: 3 | Status: SHIPPED | OUTPATIENT
Start: 2024-04-16

## 2024-04-16 RX ORDER — TADALAFIL 5 MG/1
5 TABLET ORAL DAILY
Qty: 90 TABLET | Refills: 3 | Status: SHIPPED | OUTPATIENT
Start: 2024-04-16

## 2024-04-16 NOTE — PROGRESS NOTES
UROLOGY PROGRESS NOTE         NAME: Jose Hopkins  AGE: 68 y.o. SEX: male  : 1955   MRN: [id number]    DATE: 2024  TIME: 11:31 AM    Assessment and Plan      Impression:   1. OAB (overactive bladder)    2. Benign prostatic hyperplasia with nocturia  -     PSA Total, Diagnostic; Future  -     tadalafil (CIALIS) 5 MG tablet; Take 1 tablet (5 mg total) by mouth in the morning      History of BPH history of overactive bladder history of erectile dysfunction.  Options discussed.   Plan: We will continue the trospium if it helps in the meantime I will start him on Cialis 5 mg daily to see if that helps erectile dysfunction and the BPH symptoms.  His most bothersome symptom is the urgency.  Due for PSA.      Chief Complaint   No chief complaint on file.    History of Present Illness     HPI: Jose Hopkins is a 68 y.o. year old male who presents with history of BPH main symptom and urgency.  He had problems with the oxybutynin and discontinued it.  The Sanctura has been helping he does have slow flow but his main complaint is urgency he voids about 10 times per day every 1-2 hours.  Flow is on the slow side.  PVR has notoriously been low.  Nocturia 0-1 times per night.  It has been worse lately since his foot injury and has been drinking more water.  He did have erectile dysfunction the Viagra worked well for him currently not taking anymore but he is interested in trying something again.              The following portions of the patient's history were reviewed and updated as appropriate: allergies, current medications, past family history, past medical history, past social history, past surgical history and problem list.  Past Medical History:   Diagnosis Date    High cholesterol     Leg pain      Past Surgical History:   Procedure Laterality Date    VASECTOMY N/A      shoulder  Review of Systems     Const: Denies chills, fever and weight loss.  CV: Denies chest pain.  Resp: Denies SOB.  GI:  "Denies abdominal pain, nausea and vomiting.  : Denies symptoms other than stated above.  Musculo: Denies back pain.    Objective   /96   Pulse 96   Temp 98.1 °F (36.7 °C)   Resp 20   Ht 5' 10\" (1.778 m)   Wt 112 kg (246 lb)   SpO2 96%   BMI 35.30 kg/m²     Physical Exam  Const: Appears healthy and well developed. No signs of acute distress present.  Resp: Respirations are regular and unlabored.   CV: Rate is regular. Rhythm is regular.  Abdomen: Abdomen is soft, nontender, and nondistended. Kidneys are not palpable.  : Penis testicles epididymis normal.  No hernias.  No SP tenderness no CVA tenderness.  Prostate 1+ benign.  Psych: Patient's attitude is cooperative. Mood is normal. Affect is normal.    Current Medications     Current Outpatient Medications:     Ascorbic Acid (C 500 PO), , Disp: , Rfl:     aspirin (ECOTRIN LOW STRENGTH) 81 mg EC tablet, Take 81 mg by mouth, Disp: , Rfl:     Calcium Carbonate 1500 (600 Ca) MG TABS, Take by mouth, Disp: , Rfl:     Cetirizine HCl (ZyrTEC Allergy) 10 MG CAPS, Take 1 capsule by mouth daily, Disp: , Rfl:     Coenzyme Q10 400 MG CAPS, Take by mouth, Disp: , Rfl:     ketoconazole (NIZORAL) 2 % cream, Apply 1 Application topically 2 (two) times a day, Disp: , Rfl:     losartan (COZAAR) 50 mg tablet, Take 50 mg by mouth daily, Disp: , Rfl:     omeprazole (PriLOSEC) 20 mg delayed release capsule, Take 20 mg by mouth daily, Disp: , Rfl:     Probiotic Product (Misc Intestinal Carmel Regulat) CAPS, Take by mouth, Disp: , Rfl:     Respiratory Therapy Supplies (CareTouch CPAP & BIPAP Hose) MISC, , Disp: , Rfl:     simvastatin (ZOCOR) 40 mg tablet, Take 40 mg by mouth daily, Disp: , Rfl:     tadalafil (CIALIS) 5 MG tablet, Take 1 tablet (5 mg total) by mouth in the morning, Disp: 90 tablet, Rfl: 3    trospium chloride (SANCTURA) 20 mg tablet, Take 1 tablet (20 mg total) by mouth 2 (two) times a day, Disp: 180 tablet, Rfl: 0    Zn-Pyg Afri-Nettle-Saw Palmet (SAW " PALMETTO COMPLEX PO), Take 450 mg by mouth, Disp: , Rfl:     Azelastine HCl 137 MCG/SPRAY SOLN, 1 spray into each nostril 2 (two) times a day (Patient not taking: Reported on 4/16/2024), Disp: , Rfl:     azithromycin (ZITHROMAX) 250 mg tablet, TAKE 2 TABS BY MOUTH ON FIRST DAY THEN ONE TAB BY MOUTH DAY 2, 3, 4, 5 (Patient not taking: Reported on 4/16/2024), Disp: , Rfl:     Carboxymeth-Glycerin-Polysorb 0.5-1-0.5 % SOLN, Apply to eye (Patient not taking: Reported on 2/20/2023), Disp: , Rfl:     Cholecalciferol (D3 High Potency) 10 MCG (400 UNIT) TABS, , Disp: , Rfl:     Cholecalciferol 25 MCG (1000 UT) tablet, Take 1,000 Units by mouth (Patient not taking: Reported on 2/20/2023), Disp: , Rfl:     ciclopirox (PENLAC) 8 % solution, Apply 1 Application topically daily at bedtime (Patient not taking: Reported on 4/16/2024), Disp: , Rfl:     Dextromethorphan-guaiFENesin (DM-guaiFENesin ER)  MG per 12 hr tablet, , Disp: , Rfl:     famotidine (PEPCID) 10 mg tablet, Take 10 mg by mouth daily (Patient not taking: Reported on 4/16/2024), Disp: , Rfl:     Probiotic, Lactobacillus, CAPS, Take 1 caplet by mouth daily (Patient not taking: Reported on 4/16/2024), Disp: , Rfl:     sildenafil (VIAGRA) 100 mg tablet, Take 50 mg by mouth daily as needed for erectile dysfunction (Patient not taking: Reported on 4/16/2024), Disp: , Rfl:         Frank D'Amico, MD

## 2024-04-17 ENCOUNTER — TELEPHONE (OUTPATIENT)
Age: 69
End: 2024-04-17

## 2024-04-17 NOTE — TELEPHONE ENCOUNTER
PA for TADALAFIL 5 MG     Submitted via    []CMM-KEY   []Surescripts-Case ID #   []Faxed to plan     [x]Other website PROMPTPA [facility] ID [id number]    []Phone call Case ID #     Office notes sent, clinical questions answered. Awaiting determination    Turnaround time for your insurance to make a decision on your Prior Authorization can take 7-21 business days.

## 2024-04-17 NOTE — TELEPHONE ENCOUNTER
OFFICE NOTES HAVE BEEN SUBMITTED TO PLAN. THERE IS NO DOCUMENTATION THAT PT HAS TRIED AND FAILED THE MEDICATIONS LISTED.    WILL AWAIT PLAN RESPONSE

## 2024-04-17 NOTE — TELEPHONE ENCOUNTER
Mae from pt's insurance needs documentation that pt tried and failed one of each class    Finasteride, Dutasteride and Tmasulosin Aflusozin Please advise.

## 2024-04-19 NOTE — TELEPHONE ENCOUNTER
PA for TADALIFIL Denied    Reason:(Screenshot if applicable)        Message sent to office clinical pool No      Denial letter scanned into Media Yes

## 2024-04-26 ENCOUNTER — HOSPITAL ENCOUNTER (OUTPATIENT)
Dept: RADIOLOGY | Facility: HOSPITAL | Age: 69
End: 2024-04-26
Payer: COMMERCIAL

## 2024-04-26 DIAGNOSIS — S91.332A PUNCTURE WOUND WITHOUT FOREIGN BODY, LEFT FOOT, INITIAL ENCOUNTER: ICD-10-CM

## 2024-04-26 PROCEDURE — 73620 X-RAY EXAM OF FOOT: CPT

## 2024-05-18 ENCOUNTER — APPOINTMENT (OUTPATIENT)
Dept: LAB | Facility: HOSPITAL | Age: 69
End: 2024-05-18
Payer: COMMERCIAL

## 2024-05-18 DIAGNOSIS — I10 ESSENTIAL HYPERTENSION, MALIGNANT: ICD-10-CM

## 2024-05-18 DIAGNOSIS — K21.9 GASTROESOPHAGEAL REFLUX DISEASE, UNSPECIFIED WHETHER ESOPHAGITIS PRESENT: ICD-10-CM

## 2024-05-18 DIAGNOSIS — F52.21 ERECTILE DYSFUNCTION OF NONORGANIC ORIGIN: ICD-10-CM

## 2024-05-18 DIAGNOSIS — E66.9 OBESITY, UNSPECIFIED CLASSIFICATION, UNSPECIFIED OBESITY TYPE, UNSPECIFIED WHETHER SERIOUS COMORBIDITY PRESENT: ICD-10-CM

## 2024-05-18 LAB
25(OH)D3 SERPL-MCNC: 24.2 NG/ML (ref 30–100)
CHOLEST SERPL-MCNC: 164 MG/DL
HDLC SERPL-MCNC: 32 MG/DL
LDLC SERPL CALC-MCNC: 100 MG/DL (ref 0–100)
NONHDLC SERPL-MCNC: 132 MG/DL
TRIGL SERPL-MCNC: 158 MG/DL
TSH SERPL DL<=0.05 MIU/L-ACNC: 1.77 UIU/ML (ref 0.45–4.5)

## 2024-05-18 PROCEDURE — 84403 ASSAY OF TOTAL TESTOSTERONE: CPT

## 2024-05-18 PROCEDURE — 84443 ASSAY THYROID STIM HORMONE: CPT

## 2024-05-18 PROCEDURE — 84402 ASSAY OF FREE TESTOSTERONE: CPT

## 2024-05-18 PROCEDURE — 36415 COLL VENOUS BLD VENIPUNCTURE: CPT

## 2024-05-18 PROCEDURE — 80061 LIPID PANEL: CPT

## 2024-05-18 PROCEDURE — 82306 VITAMIN D 25 HYDROXY: CPT

## 2024-05-20 LAB
TESTOST FREE SERPL-MCNC: 3 PG/ML (ref 6.6–18.1)
TESTOST SERPL-MCNC: 210 NG/DL (ref 264–916)

## 2024-05-22 ENCOUNTER — OFFICE VISIT (OUTPATIENT)
Dept: UROLOGY | Facility: CLINIC | Age: 69
End: 2024-05-22
Payer: COMMERCIAL

## 2024-05-22 VITALS
TEMPERATURE: 98 F | OXYGEN SATURATION: 97 % | HEART RATE: 59 BPM | WEIGHT: 247 LBS | DIASTOLIC BLOOD PRESSURE: 62 MMHG | SYSTOLIC BLOOD PRESSURE: 142 MMHG | HEIGHT: 70 IN | BODY MASS INDEX: 35.36 KG/M2

## 2024-05-22 DIAGNOSIS — N45.1 EPIDIDYMITIS: ICD-10-CM

## 2024-05-22 DIAGNOSIS — R35.1 BENIGN PROSTATIC HYPERPLASIA WITH NOCTURIA: Primary | ICD-10-CM

## 2024-05-22 DIAGNOSIS — N40.1 BENIGN PROSTATIC HYPERPLASIA WITH NOCTURIA: Primary | ICD-10-CM

## 2024-05-22 DIAGNOSIS — E66.01 OBESITY, MORBID (HCC): ICD-10-CM

## 2024-05-22 PROCEDURE — 99214 OFFICE O/P EST MOD 30 MIN: CPT | Performed by: UROLOGY

## 2024-05-22 RX ORDER — MAGNESIUM 30 MG
30 TABLET ORAL 2 TIMES DAILY
COMMUNITY

## 2024-05-22 RX ORDER — CIPROFLOXACIN 500 MG/1
500 TABLET, FILM COATED ORAL EVERY 12 HOURS SCHEDULED
Qty: 14 TABLET | Refills: 0 | Status: SHIPPED | OUTPATIENT
Start: 2024-05-22 | End: 2024-05-29

## 2024-05-22 RX ORDER — ZINC GLUCONATE 50 MG
50 TABLET ORAL DAILY
COMMUNITY

## 2024-05-22 NOTE — PROGRESS NOTES
UROLOGY PROGRESS NOTE         NAME: Jose Hopkins  AGE: 68 y.o. SEX: male  : 1955   MRN: [id number]    DATE: 2024  TIME: 10:39 AM    Assessment and Plan      Impression:   1. Benign prostatic hyperplasia with nocturia  2. Epididymitis    Epididymitis.  History of vasectomy.  Likely not infected.   Plan: Scrotal ultrasound to rule out a mass.  Cipro twice daily for a week.  He will take over-the-counter naproxen.  He will speak up if it is not getting better.  He will keep his follow-up next year.  The Cialis is working well for both his BPH and erectile dysfunction.  The trospium has been stopped.      Chief Complaint     Chief Complaint   Patient presents with    lump to left testicle noticed about one week ago. Firm to t     Noticed firm lump on left testicle about 3 weeks ago. Had lump on right testicle about one year ago.     History of Present Illness     HPI: Jose Hopkins is a 68 y.o. year old male who presents with 2-week history of left scrotal discomfort and swelling.  He had a vasectomy years ago.  No fever or chills.  No significant voiding dysfunction.  His voiding symptoms have improved on Cialis daily.  His erectile dysfunction is improved as well.  He discontinued the trospium.  No fever or chills with this.  No nausea or vomiting.              The following portions of the patient's history were reviewed and updated as appropriate: allergies, current medications, past family history, past medical history, past social history, past surgical history and problem list.  Past Medical History:   Diagnosis Date    BPH (benign prostatic hyperplasia)     ED (erectile dysfunction)     High cholesterol     Leg pain     Melanoma (HCC) 2024    right cheek     Past Surgical History:   Procedure Laterality Date    VASECTOMY N/A      shoulder  Review of Systems     Const: Denies chills, fever and weight loss.  CV: Denies chest pain.  Resp: Denies SOB.  GI: Denies abdominal pain,  "nausea and vomiting.  : Denies symptoms other than stated above.  Musculo: Denies back pain.    Objective   /62   Pulse 59   Temp 98 °F (36.7 °C)   Ht 5' 10\" (1.778 m)   Wt 112 kg (247 lb)   SpO2 97%   BMI 35.44 kg/m²     Physical Exam  Const: Appears healthy and well developed. No signs of acute distress present.  Resp: Respirations are regular and unlabored.   CV: Rate is regular. Rhythm is regular.  Abdomen: Abdomen is soft, nontender, and nondistended. Kidneys are not palpable.  : Right testicle and epididymis normal.  No hernia.  Left epididymis grossly swollen.  Testicle feels soft mildly tender.  Left epididymis is tender.  No hernia palpated on the left side.  Psych: Patient's attitude is cooperative. Mood is normal. Affect is normal.    Current Medications     Current Outpatient Medications:     Ascorbic Acid (C 500 PO), , Disp: , Rfl:     aspirin (ECOTRIN LOW STRENGTH) 81 mg EC tablet, Take 81 mg by mouth, Disp: , Rfl:     Calcium Carbonate 1500 (600 Ca) MG TABS, Take by mouth, Disp: , Rfl:     Cholecalciferol 25 MCG (1000 UT) tablet, Take 1,000 Units by mouth, Disp: , Rfl:     Coenzyme Q10 400 MG CAPS, Take by mouth, Disp: , Rfl:     ketoconazole (NIZORAL) 2 % cream, Apply 1 Application topically 2 (two) times a day, Disp: , Rfl:     losartan (COZAAR) 50 mg tablet, Take 50 mg by mouth daily, Disp: , Rfl:     magnesium 30 MG tablet, Take 30 mg by mouth 2 (two) times a day, Disp: , Rfl:     omeprazole (PriLOSEC) 20 mg delayed release capsule, Take 20 mg by mouth daily, Disp: , Rfl:     Probiotic Product (Misc Intestinal Carmel Regulat) CAPS, Take by mouth, Disp: , Rfl:     Respiratory Therapy Supplies (CareTouch CPAP & BIPAP Hose) MISC, , Disp: , Rfl:     simvastatin (ZOCOR) 40 mg tablet, Take 40 mg by mouth daily, Disp: , Rfl:     tadalafil (CIALIS) 5 MG tablet, Take 1 tablet (5 mg total) by mouth in the morning, Disp: 90 tablet, Rfl: 3    zinc gluconate 50 mg tablet, Take 50 mg by mouth " daily, Disp: , Rfl:     Zn-Pyg Afri-Nettle-Saw Palmet (SAW PALMETTO COMPLEX PO), Take 450 mg by mouth, Disp: , Rfl:     Carboxymeth-Glycerin-Polysorb 0.5-1-0.5 % SOLN, Apply to eye (Patient not taking: Reported on 2/20/2023), Disp: , Rfl:     ciclopirox (PENLAC) 8 % solution, Apply 1 Application topically daily at bedtime (Patient not taking: Reported on 4/16/2024), Disp: , Rfl:     Dextromethorphan-guaiFENesin (DM-guaiFENesin ER)  MG per 12 hr tablet, , Disp: , Rfl:     famotidine (PEPCID) 10 mg tablet, Take 10 mg by mouth daily (Patient not taking: Reported on 4/16/2024), Disp: , Rfl:     Probiotic, Lactobacillus, CAPS, Take 1 caplet by mouth daily (Patient not taking: Reported on 4/16/2024), Disp: , Rfl:         Frank D'Amico, MD

## 2024-05-23 ENCOUNTER — HOSPITAL ENCOUNTER (OUTPATIENT)
Dept: ULTRASOUND IMAGING | Facility: HOSPITAL | Age: 69
End: 2024-05-23
Attending: UROLOGY
Payer: COMMERCIAL

## 2024-05-23 DIAGNOSIS — R35.1 BENIGN PROSTATIC HYPERPLASIA WITH NOCTURIA: ICD-10-CM

## 2024-05-23 DIAGNOSIS — N45.1 EPIDIDYMITIS: ICD-10-CM

## 2024-05-23 DIAGNOSIS — N40.1 BENIGN PROSTATIC HYPERPLASIA WITH NOCTURIA: ICD-10-CM

## 2024-05-23 PROCEDURE — 76870 US EXAM SCROTUM: CPT

## 2024-06-04 ENCOUNTER — TELEPHONE (OUTPATIENT)
Dept: UROLOGY | Facility: CLINIC | Age: 69
End: 2024-06-04

## 2024-06-04 DIAGNOSIS — N45.1 EPIDIDYMITIS: Primary | ICD-10-CM

## 2024-06-04 NOTE — TELEPHONE ENCOUNTER
Kirsten  from [facility] radiology called stating there are abnormal finding on the US scrotum and testicles that Dr. D'Amico had order

## 2024-06-07 NOTE — TELEPHONE ENCOUNTER
US reviewed  two testes  normal blood flow  left epididymitis- is on cipro for same based on physical exam last week- complete the abx course as prescribed    followup US in 3 months order placed

## 2024-06-10 NOTE — TELEPHONE ENCOUNTER
Patient returning call, informed of Maryjane's message and verbalized understanding.    Patient had testosterone levels at recent PCP appt.    He is looking for any recommendations that Dr. D'Amico may have in regards to low testosterone levels.    Patient reports he has made some changes including routine walks for exercise, adjusted his Vitamin D3 from 1000 IUD to  2000 IUD BID, he increased leafy greens as well.

## 2024-06-10 NOTE — TELEPHONE ENCOUNTER
Hi Please tell Jose he is doing a great job. These changes are all beneficial.  In addition he can try some light weight lifting, as little or as much as he is able to comfortably tolerate. Even a small amount can prove beneficial.    I have reviewed his chart and see that he has sleep apnea, please ask if this is being effectively managed?  Is he using CPAP machine at night?  Sleep apnea can be a direct cause of low testosterone by decreasing pituitary gonadal function.    Regarding the testosterone level, its customary for repeat testosterone draw to confirm results.This can be done in about 6 months. It should be drawn in the morning for optimal results. Between 7-11 is best. In the meantime, its important to address the sleep apnea as this should be treated prior to proceeding with testosterone work up.  Oftentimes, this can provide relief without the addition of testosterone.      Having said that, we are here to help. We can order a repeat testosterone after sleep apnea is managed and also after a period of 6 months.    Hope this helps

## 2024-06-10 NOTE — TELEPHONE ENCOUNTER
Relayed message from Yumiko Linton N.P.  Verbalized understanding. Will get labs in 6 months and U/S in 3 months.  Will increase exercise.  Does wear a CPAP qhs with good effect.  Nothing else needed at this time.

## 2024-06-15 DIAGNOSIS — Z00.6 ENCOUNTER FOR EXAMINATION FOR NORMAL COMPARISON OR CONTROL IN CLINICAL RESEARCH PROGRAM: ICD-10-CM

## 2024-06-18 ENCOUNTER — APPOINTMENT (OUTPATIENT)
Dept: LAB | Facility: HOSPITAL | Age: 69
End: 2024-06-18

## 2024-06-18 DIAGNOSIS — Z00.6 ENCOUNTER FOR EXAMINATION FOR NORMAL COMPARISON OR CONTROL IN CLINICAL RESEARCH PROGRAM: ICD-10-CM

## 2024-06-18 PROCEDURE — 36415 COLL VENOUS BLD VENIPUNCTURE: CPT

## 2024-07-22 ENCOUNTER — OFFICE VISIT (OUTPATIENT)
Dept: ENDOCRINOLOGY | Facility: CLINIC | Age: 69
End: 2024-07-22
Payer: COMMERCIAL

## 2024-07-22 VITALS
WEIGHT: 246 LBS | TEMPERATURE: 98 F | OXYGEN SATURATION: 98 % | HEIGHT: 70 IN | SYSTOLIC BLOOD PRESSURE: 140 MMHG | HEART RATE: 63 BPM | DIASTOLIC BLOOD PRESSURE: 90 MMHG | BODY MASS INDEX: 35.22 KG/M2

## 2024-07-22 DIAGNOSIS — E29.1 HYPOGONADISM IN MALE: ICD-10-CM

## 2024-07-22 DIAGNOSIS — E55.9 VITAMIN D INSUFFICIENCY: ICD-10-CM

## 2024-07-22 DIAGNOSIS — R79.89 LOW TESTOSTERONE: Primary | ICD-10-CM

## 2024-07-22 DIAGNOSIS — N40.0 BENIGN PROSTATIC HYPERPLASIA, UNSPECIFIED WHETHER LOWER URINARY TRACT SYMPTOMS PRESENT: ICD-10-CM

## 2024-07-22 PROCEDURE — 99204 OFFICE O/P NEW MOD 45 MIN: CPT | Performed by: STUDENT IN AN ORGANIZED HEALTH CARE EDUCATION/TRAINING PROGRAM

## 2024-07-22 NOTE — PROGRESS NOTES
Jose Hopkins 69 y.o. male MRN: [id number]    Encounter: [id number]      Assessment & Plan     Problem List Items Addressed This Visit     Low testosterone - Primary     Patient with low testosterone <250 x1. There is history of epididymitis. Patient with symptoms including ED. HPG axis anatomy and physiology reviewed. Pathophysiology of hypogonadism discussed. Will proceed with additional biochemical eval. Given history of obesity, consider SHBG abnormality. Would like to check free testosterone by equilibrium dialysis, in addition to SHBG. I would like to have Teddy RTC in 4-weeks after having labs done to review their results and to discuss a treatment plan.          Relevant Orders    TSH, 3rd generation    T4, free    Luteinizing hormone    Prolactin    Testosterone, Free/Tot Equilib    Sex Hormone Binding Globulin    Comprehensive metabolic panel    CBC and differential    Vitamin D insufficiency     Patient is on vit D replacement for low levels. Will re-assess         Relevant Orders    Vitamin D 25 hydroxy   Other Visit Diagnoses     Benign prostatic hyperplasia, unspecified whether lower urinary tract symptoms present        Hypogonadism in male        Relevant Orders    TSH, 3rd generation    T4, free        RTC 4-weeks    CC: low testosterone in male    History of Present Illness     HPI:    Teddy presents today for evaluation of low testosterone.  He is joined today by his wife who is contributing to elements of history.    Teddy recently had labs done outpatient showing low levels of total testosterone and low levels of free testosterone by direct analog method.  Patient had been following with urology for epididymitis and BPH.  Testosterone labs were obtained prior to onset of antibiotic course for epididymitis.  He reports several year history of erectile dysfunction that is partially responsive to PDE 5 inhibitors.  He has a 4 biologic children.  He denies any history of head trauma or testicular  trauma.  No history of systemic steroids or opioid use. He presently takes medications for hypertension and hyperlipidemia. No history of DVT. No history of heart disease.     Review of Systems   Constitutional:  Negative for diaphoresis and unexpected weight change.   Gastrointestinal:  Negative for nausea and vomiting.   Endocrine:        +ED   All other systems reviewed and are negative.      Historical Information   Past Medical History:   Diagnosis Date   • BPH (benign prostatic hyperplasia)    • ED (erectile dysfunction)    • High cholesterol    • Leg pain    • Melanoma (HCC) 05/01/2024    right cheek     Past Surgical History:   Procedure Laterality Date   • VASECTOMY N/A 1978     Social History   Social History     Substance and Sexual Activity   Alcohol Use Yes   • Alcohol/week: 2.0 standard drinks of alcohol   • Types: 2 Cans of beer per week    Comment: 2 per week     Social History     Substance and Sexual Activity   Drug Use Never     Social History     Tobacco Use   Smoking Status Never   Smokeless Tobacco Never     Family History:   Family History   Problem Relation Age of Onset   • Liver cancer Father    • Dementia Mother        Meds/Allergies   Current Outpatient Medications   Medication Sig Dispense Refill   • Ascorbic Acid (C 500 PO)      • aspirin (ECOTRIN LOW STRENGTH) 81 mg EC tablet Take 81 mg by mouth     • Calcium Carbonate 1500 (600 Ca) MG TABS Take by mouth     • Cholecalciferol 25 MCG (1000 UT) tablet Take 4,000 Units by mouth daily     • Coenzyme Q10 400 MG CAPS Take by mouth     • losartan (COZAAR) 50 mg tablet Take 50 mg by mouth daily     • magnesium 30 MG tablet Take 30 mg by mouth 2 (two) times a day     • omeprazole (PriLOSEC) 20 mg delayed release capsule Take 20 mg by mouth daily     • Probiotic Product (Misc Intestinal Carmel Regulat) CAPS Take by mouth     • Respiratory Therapy Supplies (CareTouch CPAP & BIPAP Hose) MISC      • simvastatin (ZOCOR) 40 mg tablet Take 40 mg by  "mouth daily     • tadalafil (CIALIS) 5 MG tablet Take 1 tablet (5 mg total) by mouth in the morning 90 tablet 3   • zinc gluconate 50 mg tablet Take 50 mg by mouth daily     • Carboxymeth-Glycerin-Polysorb 0.5-1-0.5 % SOLN Apply to eye (Patient not taking: Reported on 2/20/2023)     • ciclopirox (PENLAC) 8 % solution Apply 1 Application topically daily at bedtime (Patient not taking: Reported on 4/16/2024)     • Dextromethorphan-guaiFENesin (DM-guaiFENesin ER)  MG per 12 hr tablet  (Patient not taking: Reported on 2/20/2023)     • famotidine (PEPCID) 10 mg tablet Take 10 mg by mouth daily (Patient not taking: Reported on 4/16/2024)     • ketoconazole (NIZORAL) 2 % cream Apply 1 Application topically 2 (two) times a day     • Probiotic, Lactobacillus, CAPS Take 1 caplet by mouth daily (Patient not taking: Reported on 4/16/2024)     • Zn-Pyg Afri-Nettle-Saw Palmet (SAW PALMETTO COMPLEX PO) Take 450 mg by mouth       No current facility-administered medications for this visit.     No Known Allergies    Objective   Vitals: Blood pressure 140/90, pulse 63, temperature 98 °F (36.7 °C), height 5' 10\" (1.778 m), weight 112 kg (246 lb), SpO2 98%.    Physical Exam  Vitals reviewed.   Constitutional:       Appearance: Normal appearance. He is not ill-appearing.   HENT:      Head: Normocephalic and atraumatic.      Nose: Nose normal.   Eyes:      General: No scleral icterus.     Conjunctiva/sclera: Conjunctivae normal.      Comments: No exophthalmos   Cardiovascular:      Pulses: Normal pulses.   Pulmonary:      Effort: Pulmonary effort is normal. No respiratory distress.   Abdominal:      Palpations: Abdomen is soft.      Tenderness: There is no abdominal tenderness.   Musculoskeletal:         General: No deformity.   Skin:     General: Skin is warm and dry.   Neurological:      General: No focal deficit present.      Mental Status: He is alert.      Comments: No tremor to outstretched hands   Psychiatric:         Mood " "and Affect: Mood normal.         Behavior: Behavior normal.         The history was obtained from the review of the chart, patient and family.    Lab Results:   Lab Results   Component Value Date/Time    TSH 3RD GENERATON 1.766 05/18/2024 10:35 AM    Testosterone, Free 3.0 (L) 05/18/2024 10:35 AM             Imaging Studies:         I have personally reviewed pertinent reports.      Portions of the record may have been created with voice recognition software. Occasional wrong word or \"sound a like\" substitutions may have occurred due to the inherent limitations of voice recognition software. Read the chart carefully and recognize, using context, where substitutions have occurred.    "

## 2024-07-22 NOTE — ASSESSMENT & PLAN NOTE
Patient with low testosterone <250 x1. There is history of epididymitis. Patient with symptoms including ED. HPG axis anatomy and physiology reviewed. Pathophysiology of hypogonadism discussed. Will proceed with additional biochemical eval. Given history of obesity, consider SHBG abnormality. Would like to check free testosterone by equilibrium dialysis, in addition to SHBG. I would like to have Rich RTC in 4-weeks after having labs done to review their results and to discuss a treatment plan.

## 2024-07-23 ENCOUNTER — APPOINTMENT (OUTPATIENT)
Dept: LAB | Facility: HOSPITAL | Age: 69
End: 2024-07-23
Payer: COMMERCIAL

## 2024-07-23 DIAGNOSIS — R79.89 LOW TESTOSTERONE: Primary | ICD-10-CM

## 2024-07-23 LAB
25(OH)D3 SERPL-MCNC: 35.1 NG/ML (ref 30–100)
ALBUMIN SERPL BCG-MCNC: 3.8 G/DL (ref 3.5–5)
ALP SERPL-CCNC: 77 U/L (ref 34–104)
ALT SERPL W P-5'-P-CCNC: 25 U/L (ref 7–52)
ANION GAP SERPL CALCULATED.3IONS-SCNC: 5 MMOL/L (ref 4–13)
AST SERPL W P-5'-P-CCNC: 17 U/L (ref 13–39)
BASOPHILS # BLD AUTO: 0.08 THOUSANDS/ÂΜL (ref 0–0.1)
BASOPHILS NFR BLD AUTO: 1 % (ref 0–1)
BILIRUB SERPL-MCNC: 0.55 MG/DL (ref 0.2–1)
BUN SERPL-MCNC: 25 MG/DL (ref 5–25)
CALCIUM SERPL-MCNC: 9.1 MG/DL (ref 8.4–10.2)
CHLORIDE SERPL-SCNC: 107 MMOL/L (ref 96–108)
CO2 SERPL-SCNC: 28 MMOL/L (ref 21–32)
CREAT SERPL-MCNC: 1.18 MG/DL (ref 0.6–1.3)
EOSINOPHIL # BLD AUTO: 0.54 THOUSAND/ÂΜL (ref 0–0.61)
EOSINOPHIL NFR BLD AUTO: 8 % (ref 0–6)
ERYTHROCYTE [DISTWIDTH] IN BLOOD BY AUTOMATED COUNT: 12.7 % (ref 11.6–15.1)
GFR SERPL CREATININE-BSD FRML MDRD: 61 ML/MIN/1.73SQ M
GLUCOSE P FAST SERPL-MCNC: 96 MG/DL (ref 65–99)
HCT VFR BLD AUTO: 48.2 % (ref 36.5–49.3)
HGB BLD-MCNC: 15.8 G/DL (ref 12–17)
IMM GRANULOCYTES # BLD AUTO: 0.01 THOUSAND/UL (ref 0–0.2)
IMM GRANULOCYTES NFR BLD AUTO: 0 % (ref 0–2)
LH SERPL-ACNC: 2.3 MIU/ML
LYMPHOCYTES # BLD AUTO: 1.72 THOUSANDS/ÂΜL (ref 0.6–4.47)
LYMPHOCYTES NFR BLD AUTO: 25 % (ref 14–44)
MCH RBC QN AUTO: 30.1 PG (ref 26.8–34.3)
MCHC RBC AUTO-ENTMCNC: 32.8 G/DL (ref 31.4–37.4)
MCV RBC AUTO: 92 FL (ref 82–98)
MONOCYTES # BLD AUTO: 0.57 THOUSAND/ÂΜL (ref 0.17–1.22)
MONOCYTES NFR BLD AUTO: 8 % (ref 4–12)
NEUTROPHILS # BLD AUTO: 3.94 THOUSANDS/ÂΜL (ref 1.85–7.62)
NEUTS SEG NFR BLD AUTO: 58 % (ref 43–75)
NRBC BLD AUTO-RTO: 0 /100 WBCS
PLATELET # BLD AUTO: 203 THOUSANDS/UL (ref 149–390)
PMV BLD AUTO: 9.9 FL (ref 8.9–12.7)
POTASSIUM SERPL-SCNC: 3.9 MMOL/L (ref 3.5–5.3)
PROLACTIN SERPL-MCNC: 14.28 NG/ML
PROT SERPL-MCNC: 6.5 G/DL (ref 6.4–8.4)
RBC # BLD AUTO: 5.25 MILLION/UL (ref 3.88–5.62)
SODIUM SERPL-SCNC: 140 MMOL/L (ref 135–147)
T4 FREE SERPL-MCNC: 0.72 NG/DL (ref 0.61–1.12)
TSH SERPL DL<=0.05 MIU/L-ACNC: 3.49 UIU/ML (ref 0.45–4.5)
WBC # BLD AUTO: 6.86 THOUSAND/UL (ref 4.31–10.16)

## 2024-07-23 PROCEDURE — 84270 ASSAY OF SEX HORMONE GLOBUL: CPT | Performed by: STUDENT IN AN ORGANIZED HEALTH CARE EDUCATION/TRAINING PROGRAM

## 2024-07-23 PROCEDURE — 84439 ASSAY OF FREE THYROXINE: CPT | Performed by: STUDENT IN AN ORGANIZED HEALTH CARE EDUCATION/TRAINING PROGRAM

## 2024-07-23 PROCEDURE — 84403 ASSAY OF TOTAL TESTOSTERONE: CPT

## 2024-07-23 PROCEDURE — 83002 ASSAY OF GONADOTROPIN (LH): CPT | Performed by: STUDENT IN AN ORGANIZED HEALTH CARE EDUCATION/TRAINING PROGRAM

## 2024-07-23 PROCEDURE — 80053 COMPREHEN METABOLIC PANEL: CPT | Performed by: STUDENT IN AN ORGANIZED HEALTH CARE EDUCATION/TRAINING PROGRAM

## 2024-07-23 PROCEDURE — 85025 COMPLETE CBC W/AUTO DIFF WBC: CPT | Performed by: STUDENT IN AN ORGANIZED HEALTH CARE EDUCATION/TRAINING PROGRAM

## 2024-07-23 PROCEDURE — 84402 ASSAY OF FREE TESTOSTERONE: CPT

## 2024-07-23 PROCEDURE — 82306 VITAMIN D 25 HYDROXY: CPT | Performed by: STUDENT IN AN ORGANIZED HEALTH CARE EDUCATION/TRAINING PROGRAM

## 2024-07-23 PROCEDURE — 36415 COLL VENOUS BLD VENIPUNCTURE: CPT | Performed by: STUDENT IN AN ORGANIZED HEALTH CARE EDUCATION/TRAINING PROGRAM

## 2024-07-23 PROCEDURE — 84443 ASSAY THYROID STIM HORMONE: CPT | Performed by: STUDENT IN AN ORGANIZED HEALTH CARE EDUCATION/TRAINING PROGRAM

## 2024-07-23 PROCEDURE — 84146 ASSAY OF PROLACTIN: CPT | Performed by: STUDENT IN AN ORGANIZED HEALTH CARE EDUCATION/TRAINING PROGRAM

## 2024-07-25 LAB — SHBG SERPL-SCNC: 18.6 NMOL/L (ref 19.3–76.4)

## 2024-08-02 LAB — MISCELLANEOUS LAB TEST RESULT: NORMAL

## 2024-08-23 ENCOUNTER — OFFICE VISIT (OUTPATIENT)
Dept: ENDOCRINOLOGY | Facility: CLINIC | Age: 69
End: 2024-08-23
Payer: COMMERCIAL

## 2024-08-23 VITALS
SYSTOLIC BLOOD PRESSURE: 138 MMHG | HEART RATE: 94 BPM | BODY MASS INDEX: 34.65 KG/M2 | OXYGEN SATURATION: 95 % | WEIGHT: 242 LBS | DIASTOLIC BLOOD PRESSURE: 82 MMHG | HEIGHT: 70 IN

## 2024-08-23 DIAGNOSIS — Z12.5 ENCOUNTER FOR SCREENING FOR MALIGNANT NEOPLASM OF PROSTATE: ICD-10-CM

## 2024-08-23 DIAGNOSIS — E29.1 HYPOGONADISM IN MALE: ICD-10-CM

## 2024-08-23 DIAGNOSIS — E23.0 HYPOGONADOTROPIC HYPOGONADISM IN MALE (HCC): Primary | ICD-10-CM

## 2024-08-23 PROCEDURE — 99214 OFFICE O/P EST MOD 30 MIN: CPT | Performed by: STUDENT IN AN ORGANIZED HEALTH CARE EDUCATION/TRAINING PROGRAM

## 2024-08-23 RX ORDER — TESTOSTERONE 1.62 MG/G
20.25 GEL TRANSDERMAL EVERY MORNING
Qty: 30 ACTUATION | Refills: 3 | Status: SHIPPED | OUTPATIENT
Start: 2024-08-23 | End: 2024-09-22

## 2024-08-23 NOTE — PROGRESS NOTES
Ambulatory Visit  Name: Jose Hopkins      : 1955      MRN: [id number]  Encounter Provider: Rivera Larsen DO  Encounter Date: 2024   Encounter department: [facility]    Assessment & Plan   1. 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 Gonadotropins are low, which may be also suspicious for age. Patient is symptomatic with sexual dysfunction. His prolactin level is minimally elevated, unlikely contributory. TFTs wnl. Pituitary MRI is being requested for imaging. Discussed testosterone, including pro/cons. Discussed therapy as trial for sx, with close monitoring of labs for AE. Hormone agreement signed by patient. Will initiate androgel 20.25 mg daily by topical application. Will arrange for follow up in 3-mo with labs for monitoring. I will follow up with patient's results of pituitary MRI  Orders:  -     MRI brain pituitary wo and w contrast; Future; Expected date: 2024  -     testosterone (ANDROGEL) 1.62 % TD gel pump; Apply 1 actuation (20.25 mg total) topically every morning  -     Comprehensive metabolic panel; Future; Expected date: 10/23/2024  -     Testosterone, free, total; Future; Expected date: 10/23/2024  -     CBC and differential; Future; Expected date: 10/23/2024  -     PSA, Total Screen; Future; Expected date: 10/23/2024  2. Hypogonadism in male  -     CBC and differential; Future; Expected date: 10/23/2024  3. Encounter for screening for malignant neoplasm of prostate  -     PSA, Total Screen; Future; Expected date: 10/23/2024    RTC 2-mo    History of Present Illness     Jose Hopkins is a 69 y.o. male who presents in follow up of low testosterone. He is here with his wife. He is here today to review labs. He maintains sx of ED.    Review of Systems   Constitutional:  Negative for diaphoresis and unexpected weight change.   Gastrointestinal:  Negative  "for nausea and vomiting.   Endocrine:        +ED   All other systems reviewed and are negative.      Objective     /82 (BP Location: Left arm, Patient Position: Sitting)   Pulse 94   Ht 5' 10\" (1.778 m)   Wt 110 kg (242 lb)   SpO2 95%   BMI 34.72 kg/m²     Physical Exam  Vitals reviewed.   Constitutional:       General: He is not in acute distress.     Appearance: Normal appearance.   HENT:      Head: Normocephalic and atraumatic.      Nose: Nose normal.   Eyes:      General: No scleral icterus.     Conjunctiva/sclera: Conjunctivae normal.   Pulmonary:      Effort: Pulmonary effort is normal. No respiratory distress.   Musculoskeletal:         General: No deformity.      Cervical back: Normal range of motion.   Skin:     General: Skin is warm and dry.   Neurological:      Mental Status: He is alert.   Psychiatric:         Mood and Affect: Mood normal.         Behavior: Behavior normal.       Component      Latest Ref Rn 7/23/2024   WBC      4.31 - 10.16 Thousand/uL 6.86    RBC      3.88 - 5.62 Million/uL 5.25    Hemoglobin      12.0 - 17.0 g/dL 15.8    Hematocrit      36.5 - 49.3 % 48.2    MCV      82 - 98 fL 92    MCH      26.8 - 34.3 pg 30.1    MCHC      31.4 - 37.4 g/dL 32.8    RDW      11.6 - 15.1 % 12.7    MPV      8.9 - 12.7 fL 9.9    Platelet Count      149 - 390 Thousands/uL 203    nRBC      /100 WBCs 0    Segmented %      43 - 75 % 58    Immature Grans %      0 - 2 % 0    Lymphocytes %      14 - 44 % 25    Monocytes %      4 - 12 % 8    Eosinophils %      0 - 6 % 8 (H)    Basophils %      0 - 1 % 1    Absolute Neutrophils      1.85 - 7.62 Thousands/µL 3.94    Absolute Immature Grans      0.00 - 0.20 Thousand/uL 0.01    Absolute Lymphocytes      0.60 - 4.47 Thousands/µL 1.72    Absolute Monocytes      0.17 - 1.22 Thousand/µL 0.57    Absolute Eosinophils      0.00 - 0.61 Thousand/µL 0.54    Absolute Basophils      0.00 - 0.10 Thousands/µL 0.08    Sodium      135 - 147 mmol/L 140    Potassium      " 3.5 - 5.3 mmol/L 3.9    Chloride      96 - 108 mmol/L 107    Carbon Dioxide      21 - 32 mmol/L 28    ANION GAP      4 - 13 mmol/L 5    BUN      5 - 25 mg/dL 25    Creatinine      0.60 - 1.30 mg/dL 1.18    GLUCOSE, FASTING      65 - 99 mg/dL 96    Calcium      8.4 - 10.2 mg/dL 9.1    AST      13 - 39 U/L 17    ALT      7 - 52 U/L 25    ALK PHOS      34 - 104 U/L 77    Total Protein      6.4 - 8.4 g/dL 6.5    Albumin      3.5 - 5.0 g/dL 3.8    Total Bilirubin      0.20 - 1.00 mg/dL 0.55    GFR, Calculated      ml/min/1.73sq m 61    TSH 3RD GENERATON      0.450 - 4.500 uIU/mL 3.487    FREE T4      0.61 - 1.12 ng/dL 0.72    LUTEINIZING HORMONE      1.2 - 8.6 mIU/mL 2.3    PROLACTIN      2.64 - 13.13 ng/mL 14.28 (H)    SEX HORMONE BINDING GLOBULIN      19.3 - 76.4 nmol/L 18.6 (L)    VITAMIN D, 25-HYDROXY      30.0 - 100.0 ng/mL 35.1    Miscellaneous Lab Test Result see written report             Administrative Statements

## 2024-08-29 PROBLEM — E23.0 HYPOGONADOTROPIC HYPOGONADISM IN MALE (HCC): Status: ACTIVE | Noted: 2024-08-29

## 2024-08-29 NOTE — ASSESSMENT & PLAN NOTE
Free testosterone is low by equilibrium dialysis. Gonadotropins are low, which may be also suspicious for age. Patient is symptomatic with sexual dysfunction. His prolactin level is minimally elevated, unlikely contributory. TFTs wnl. Pituitary MRI is being requested for imaging. Discussed testosterone, including pro/cons. Discussed therapy as trial for sx, with close monitoring of labs for AE. Hormone agreement signed by patient. Will initiate androgel 20.25 mg daily by topical application. Will arrange for follow up in 3-mo with labs for monitoring. I will follow up with patient's results of pituitary MRI

## 2024-09-07 ENCOUNTER — HOSPITAL ENCOUNTER (OUTPATIENT)
Dept: ULTRASOUND IMAGING | Facility: HOSPITAL | Age: 69
Discharge: HOME | End: 2024-09-07
Payer: COMMERCIAL

## 2024-09-07 DIAGNOSIS — N45.1 EPIDIDYMITIS: ICD-10-CM

## 2024-09-07 PROCEDURE — 76870 US EXAM SCROTUM: CPT

## 2024-09-11 ENCOUNTER — HOSPITAL ENCOUNTER (OUTPATIENT)
Dept: MRI IMAGING | Facility: HOSPITAL | Age: 69
Discharge: HOME | End: 2024-09-11
Attending: STUDENT IN AN ORGANIZED HEALTH CARE EDUCATION/TRAINING PROGRAM
Payer: COMMERCIAL

## 2024-09-11 DIAGNOSIS — E23.0 HYPOGONADOTROPIC HYPOGONADISM IN MALE (HCC): ICD-10-CM

## 2024-09-11 PROCEDURE — 70553 MRI BRAIN STEM W/O & W/DYE: CPT

## 2024-09-11 PROCEDURE — A9585 GADOBUTROL INJECTION: HCPCS | Performed by: STUDENT IN AN ORGANIZED HEALTH CARE EDUCATION/TRAINING PROGRAM

## 2024-09-11 RX ORDER — GADOBUTROL 604.72 MG/ML
11 INJECTION INTRAVENOUS
Status: COMPLETED | OUTPATIENT
Start: 2024-09-11 | End: 2024-09-11

## 2024-09-11 RX ADMIN — GADOBUTROL 11 ML: 604.72 INJECTION INTRAVENOUS at 17:20

## 2024-09-26 LAB
APOB+LDLR+PCSK9 GENE MUT ANL BLD/T: NOT DETECTED
BRCA1+BRCA2 DEL+DUP + FULL MUT ANL BLD/T: NOT DETECTED
MLH1+MSH2+MSH6+PMS2 GN DEL+DUP+FUL M: NOT DETECTED

## 2024-10-23 ENCOUNTER — APPOINTMENT (OUTPATIENT)
Dept: LAB | Facility: HOSPITAL | Age: 69
End: 2024-10-23
Payer: COMMERCIAL

## 2024-10-23 DIAGNOSIS — E23.0 HYPOGONADOTROPIC HYPOGONADISM IN MALE (HCC): ICD-10-CM

## 2024-10-23 DIAGNOSIS — E29.1 HYPOGONADISM IN MALE: ICD-10-CM

## 2024-10-23 DIAGNOSIS — N45.1 EPIDIDYMITIS: ICD-10-CM

## 2024-10-23 DIAGNOSIS — Z12.5 ENCOUNTER FOR SCREENING FOR MALIGNANT NEOPLASM OF PROSTATE: ICD-10-CM

## 2024-10-23 LAB
ALBUMIN SERPL BCG-MCNC: 3.9 G/DL (ref 3.5–5)
ALP SERPL-CCNC: 83 U/L (ref 34–104)
ALT SERPL W P-5'-P-CCNC: 27 U/L (ref 7–52)
ANION GAP SERPL CALCULATED.3IONS-SCNC: 5 MMOL/L (ref 4–13)
AST SERPL W P-5'-P-CCNC: 19 U/L (ref 13–39)
BASOPHILS # BLD AUTO: 0.08 THOUSANDS/ΜL (ref 0–0.1)
BASOPHILS NFR BLD AUTO: 1 % (ref 0–1)
BILIRUB SERPL-MCNC: 0.73 MG/DL (ref 0.2–1)
BUN SERPL-MCNC: 18 MG/DL (ref 5–25)
CALCIUM SERPL-MCNC: 8.9 MG/DL (ref 8.4–10.2)
CHLORIDE SERPL-SCNC: 104 MMOL/L (ref 96–108)
CO2 SERPL-SCNC: 31 MMOL/L (ref 21–32)
CREAT SERPL-MCNC: 1.14 MG/DL (ref 0.6–1.3)
EOSINOPHIL # BLD AUTO: 0.42 THOUSAND/ΜL (ref 0–0.61)
EOSINOPHIL NFR BLD AUTO: 6 % (ref 0–6)
ERYTHROCYTE [DISTWIDTH] IN BLOOD BY AUTOMATED COUNT: 13.1 % (ref 11.6–15.1)
GFR SERPL CREATININE-BSD FRML MDRD: 64 ML/MIN/1.73SQ M
GLUCOSE P FAST SERPL-MCNC: 94 MG/DL (ref 65–99)
HCT VFR BLD AUTO: 48.6 % (ref 36.5–49.3)
HGB BLD-MCNC: 15.9 G/DL (ref 12–17)
IMM GRANULOCYTES # BLD AUTO: 0.03 THOUSAND/UL (ref 0–0.2)
IMM GRANULOCYTES NFR BLD AUTO: 0 % (ref 0–2)
LYMPHOCYTES # BLD AUTO: 1.73 THOUSANDS/ΜL (ref 0.6–4.47)
LYMPHOCYTES NFR BLD AUTO: 24 % (ref 14–44)
MCH RBC QN AUTO: 29.5 PG (ref 26.8–34.3)
MCHC RBC AUTO-ENTMCNC: 32.7 G/DL (ref 31.4–37.4)
MCV RBC AUTO: 90 FL (ref 82–98)
MONOCYTES # BLD AUTO: 0.75 THOUSAND/ΜL (ref 0.17–1.22)
MONOCYTES NFR BLD AUTO: 11 % (ref 4–12)
NEUTROPHILS # BLD AUTO: 4.08 THOUSANDS/ΜL (ref 1.85–7.62)
NEUTS SEG NFR BLD AUTO: 58 % (ref 43–75)
NRBC BLD AUTO-RTO: 0 /100 WBCS
PLATELET # BLD AUTO: 190 THOUSANDS/UL (ref 149–390)
PMV BLD AUTO: 10.1 FL (ref 8.9–12.7)
POTASSIUM SERPL-SCNC: 4.4 MMOL/L (ref 3.5–5.3)
PROT SERPL-MCNC: 6.2 G/DL (ref 6.4–8.4)
PSA SERPL-MCNC: 0.55 NG/ML (ref 0–4)
RBC # BLD AUTO: 5.39 MILLION/UL (ref 3.88–5.62)
SODIUM SERPL-SCNC: 140 MMOL/L (ref 135–147)
WBC # BLD AUTO: 7.09 THOUSAND/UL (ref 4.31–10.16)

## 2024-10-23 PROCEDURE — 84402 ASSAY OF FREE TESTOSTERONE: CPT

## 2024-10-23 PROCEDURE — 84403 ASSAY OF TOTAL TESTOSTERONE: CPT

## 2024-10-23 PROCEDURE — 36415 COLL VENOUS BLD VENIPUNCTURE: CPT

## 2024-10-23 PROCEDURE — G0103 PSA SCREENING: HCPCS

## 2024-10-23 PROCEDURE — 85025 COMPLETE CBC W/AUTO DIFF WBC: CPT

## 2024-10-23 PROCEDURE — 80053 COMPREHEN METABOLIC PANEL: CPT

## 2024-10-24 LAB
TESTOST FREE SERPL-MCNC: 6.4 PG/ML (ref 6.6–18.1)
TESTOST SERPL-MCNC: 323 NG/DL (ref 264–916)

## 2024-11-07 ENCOUNTER — OFFICE VISIT (OUTPATIENT)
Dept: ENDOCRINOLOGY | Facility: CLINIC | Age: 69
End: 2024-11-07
Payer: COMMERCIAL

## 2024-11-07 VITALS
TEMPERATURE: 97.3 F | HEART RATE: 67 BPM | DIASTOLIC BLOOD PRESSURE: 100 MMHG | WEIGHT: 248 LBS | SYSTOLIC BLOOD PRESSURE: 130 MMHG | BODY MASS INDEX: 35.5 KG/M2 | HEIGHT: 70 IN

## 2024-11-07 DIAGNOSIS — I10 PRIMARY HYPERTENSION: ICD-10-CM

## 2024-11-07 DIAGNOSIS — E23.0 HYPOGONADOTROPIC HYPOGONADISM IN MALE (HCC): Primary | ICD-10-CM

## 2024-11-07 DIAGNOSIS — G47.33 OSA (OBSTRUCTIVE SLEEP APNEA): ICD-10-CM

## 2024-11-07 DIAGNOSIS — E29.1 HYPOGONADISM IN MALE: ICD-10-CM

## 2024-11-07 DIAGNOSIS — Z12.5 ENCOUNTER FOR SCREENING FOR MALIGNANT NEOPLASM OF PROSTATE: ICD-10-CM

## 2024-11-07 PROCEDURE — G2211 COMPLEX E/M VISIT ADD ON: HCPCS | Performed by: STUDENT IN AN ORGANIZED HEALTH CARE EDUCATION/TRAINING PROGRAM

## 2024-11-07 PROCEDURE — 99214 OFFICE O/P EST MOD 30 MIN: CPT | Performed by: STUDENT IN AN ORGANIZED HEALTH CARE EDUCATION/TRAINING PROGRAM

## 2024-11-07 RX ORDER — TESTOSTERONE 1.62 MG/G
40.5 GEL TRANSDERMAL EVERY MORNING
Qty: 225 G | Refills: 1 | Status: SHIPPED | OUTPATIENT
Start: 2024-11-07 | End: 2025-02-05

## 2024-11-07 NOTE — PROGRESS NOTES
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 Will increase androgel 2 pumps daily and repeat levels in 3-mo for monitoring  Orders:    testosterone (ANDROGEL) 1.62 % TD gel pump; Apply 2 actuation (40.5 mg total) topically every morning    Comprehensive metabolic panel; Future    CBC and differential; Future    PSA, Total Screen; Future    Testosterone, free, total; Future    Hypogonadism in male    Orders:    CBC and differential; Future    Encounter for screening for malignant neoplasm of prostate  F/u Psa after change in testosterone dosing  Orders:    PSA, Total Screen; Future    Primary hypertension         FRANDY (obstructive sleep apnea)  Currently on treatment       RTC 3-mo  History of Present Illness     Jose Hopkins is a 69 y.o. male who presents in follow up of hypogonadism. He is on androgel 20.5 mg daily. He reports improving erectile function and libido, but reports difficulty was climax. There is history of FRANDY on cpap. He is on anti-hypertensive therapy.     History obtained from : patient and patient's Significant Other  Review of Systems   Constitutional:  Negative for diaphoresis and unexpected weight change.   All other systems reviewed and are negative.    Medical History Reviewed by provider this encounter:       Current Outpatient Medications on File Prior to Visit   Medication Sig Dispense Refill    Ascorbic Acid (C 500 PO)       aspirin (ECOTRIN LOW STRENGTH) 81 mg EC tablet Take 81 mg by mouth      Calcium Carbonate 1500 (600 Ca) MG TABS Take by mouth      Cholecalciferol 25 MCG (1000 UT) tablet Take 5,000 Units by mouth daily      Coenzyme Q10 400 MG CAPS Take by mouth       losartan (COZAAR) 50 mg tablet Take 50 mg by mouth daily      magnesium 30 MG tablet Take 30 mg by mouth daily      omeprazole (PriLOSEC) 20 mg delayed release capsule Take 20 mg by mouth daily      Probiotic Product (Misc Intestinal Carmel Regulat) CAPS Take by mouth      Respiratory Therapy Supplies (CareTouch CPAP & BIPAP Hose) MISC       simvastatin (ZOCOR) 40 mg tablet Take 40 mg by mouth daily      tadalafil (CIALIS) 5 MG tablet Take 1 tablet (5 mg total) by mouth in the morning 90 tablet 3    zinc gluconate 50 mg tablet Take 50 mg by mouth daily      Zn-Pyg Afri-Nettle-Saw Palmet (SAW PALMETTO COMPLEX PO) Take 450 mg by mouth      [DISCONTINUED] testosterone (ANDROGEL) 1.62 % TD gel pump Apply 1 actuation (20.25 mg total) topically every morning 30 actuation 3    Carboxymeth-Glycerin-Polysorb 0.5-1-0.5 % SOLN Apply to eye (Patient not taking: Reported on 2/20/2023)      ciclopirox (PENLAC) 8 % solution Apply 1 Application topically daily at bedtime (Patient not taking: Reported on 4/16/2024)      Dextromethorphan-guaiFENesin (DM-guaiFENesin ER)  MG per 12 hr tablet  (Patient not taking: Reported on 2/20/2023)      famotidine (PEPCID) 10 mg tablet Take 10 mg by mouth daily (Patient not taking: Reported on 4/16/2024)      ketoconazole (NIZORAL) 2 % cream Apply 1 Application topically 2 (two) times a day      Probiotic, Lactobacillus, CAPS Take 1 caplet by mouth daily (Patient not taking: Reported on 4/16/2024)       No current facility-administered medications on file prior to visit.      Social History     Tobacco Use    Smoking status: Never    Smokeless tobacco: Never   Vaping Use    Vaping status: Never Used   Substance and Sexual Activity    Alcohol use: Yes     Alcohol/week: 2.0 standard drinks of alcohol     Types: 2 Cans of beer per week     Comment: 2 per week    Drug use: Never    Sexual activity: Yes     Partners: Female         Objective     /100   Pulse 67   Temp (!) 97.3 °F (36.3 °C)  "  Ht 5' 10\" (1.778 m)   Wt 112 kg (248 lb)   BMI 35.58 kg/m²     Physical Exam  Vitals reviewed.   Constitutional:       General: He is not in acute distress.     Appearance: Normal appearance.   HENT:      Head: Normocephalic and atraumatic.      Nose: Nose normal.   Eyes:      General: No scleral icterus.     Conjunctiva/sclera: Conjunctivae normal.   Pulmonary:      Effort: Pulmonary effort is normal. No respiratory distress.   Musculoskeletal:         General: No deformity.      Cervical back: Normal range of motion.   Skin:     General: Skin is warm and dry.   Neurological:      Mental Status: He is alert.   Psychiatric:         Mood and Affect: Mood normal.         Component      Latest Ref Rng 10/23/2024   WBC      4.31 - 10.16 Thousand/uL 7.09    RBC      3.88 - 5.62 Million/uL 5.39    Hemoglobin      12.0 - 17.0 g/dL 15.9    Hematocrit      36.5 - 49.3 % 48.6    MCV      82 - 98 fL 90    MCH      26.8 - 34.3 pg 29.5    MCHC      31.4 - 37.4 g/dL 32.7    RDW      11.6 - 15.1 % 13.1    MPV      8.9 - 12.7 fL 10.1    Platelet Count      149 - 390 Thousands/uL 190    nRBC      /100 WBCs 0    Segmented %      43 - 75 % 58    Immature Grans %      0 - 2 % 0    Lymphocytes %      14 - 44 % 24    Monocytes %      4 - 12 % 11    Eosinophils %      0 - 6 % 6    Basophils %      0 - 1 % 1    Absolute Neutrophils      1.85 - 7.62 Thousands/µL 4.08    Absolute Immature Grans      0.00 - 0.20 Thousand/uL 0.03    Absolute Lymphocytes      0.60 - 4.47 Thousands/µL 1.73    Absolute Monocytes      0.17 - 1.22 Thousand/µL 0.75    Absolute Eosinophils      0.00 - 0.61 Thousand/µL 0.42    Absolute Basophils      0.00 - 0.10 Thousands/µL 0.08    Sodium      135 - 147 mmol/L 140    Potassium      3.5 - 5.3 mmol/L 4.4    Chloride      96 - 108 mmol/L 104    Carbon Dioxide      21 - 32 mmol/L 31    ANION GAP      4 - 13 mmol/L 5    BUN      5 - 25 mg/dL 18    Creatinine      0.60 - 1.30 mg/dL 1.14    GLUCOSE, FASTING      65 - " 99 mg/dL 94    Calcium      8.4 - 10.2 mg/dL 8.9    AST      13 - 39 U/L 19    ALT      7 - 52 U/L 27    ALK PHOS      34 - 104 U/L 83    Total Protein      6.4 - 8.4 g/dL 6.2 (L)    Albumin      3.5 - 5.0 g/dL 3.9    Total Bilirubin      0.20 - 1.00 mg/dL 0.73    GFR, Calculated      ml/min/1.73sq m 64    TESTOSTERONE FREE      6.6 - 18.1 pg/mL 6.4 (L)    Testosterone, Total, LC/MS      264 - 916 ng/dL 323    PSA      0.000 - 4.000 ng/mL 0.548       Legend:  (L) Low

## 2024-11-07 NOTE — ASSESSMENT & PLAN NOTE
Testosterone improving, sx improving, but not quite at goal. Will increase androgel 2 pumps daily and repeat levels in 3-mo for monitoring  Orders:    testosterone (ANDROGEL) 1.62 % TD gel pump; Apply 2 actuation (40.5 mg total) topically every morning    Comprehensive metabolic panel; Future    CBC and differential; Future    PSA, Total Screen; Future    Testosterone, free, total; Future

## 2024-11-18 ENCOUNTER — TELEPHONE (OUTPATIENT)
Age: 69
End: 2024-11-18

## 2024-11-18 NOTE — TELEPHONE ENCOUNTER
Patient was at his pcp today and his BP was 183/83. He was told by the pcp to call and ask Dr. Larsen if the increased dose in testosterone could be contributing to the higher BP?  Patient states he started the increased dose on 11/7 and did not have the high BP until yesterday.  Please advise

## 2024-11-18 NOTE — TELEPHONE ENCOUNTER
Spoke to patient and he is aware to take his blood pressure in am and evening and give us a call in a few days to let us know how he is doing, he said they havent been running as high as today

## 2024-12-10 DIAGNOSIS — E23.0 HYPOGONADOTROPIC HYPOGONADISM IN MALE (HCC): ICD-10-CM

## 2024-12-11 RX ORDER — TESTOSTERONE 20.25 MG/1.25G
GEL TOPICAL
Qty: 75 G | Refills: 3 | Status: SHIPPED | OUTPATIENT
Start: 2024-12-11

## 2024-12-11 NOTE — TELEPHONE ENCOUNTER
Patient Id Prescription # Filled Written Drug Label Qty Days Strength MME** Prescriber Pharmacy Payment REFILL #/Auth State Detail  6 [id number] 11/18/2024 08/23/2024 Testosterone (Gel/jelly) 75.0 30 1.62% NA [facility] Commercial Insurance 3 / 3 [place]   8 [id number] 10/19/2024 08/23/2024 Testosterone (Gel/jelly) 75.0 30 1.62% NA [facility] Commercial Insurance 2 / 3 [place]   5 [id number] 09/19/2024 08/23/2024 Testosterone (Gel/jelly) 75.0 30 1.62% NA [facility] Commercial Insurance 1 / 3 [place]   7 [id number] 08/26/2024 08/23/2024 Testosterone (Gel/jelly) 75.0 30 1.62% NA [facility] Commercial Insurance 0 / 3 [place]

## 2025-02-05 ENCOUNTER — TELEPHONE (OUTPATIENT)
Dept: ENDOCRINOLOGY | Facility: CLINIC | Age: 70
End: 2025-02-05

## 2025-02-07 ENCOUNTER — RESULTS FOLLOW-UP (OUTPATIENT)
Dept: ENDOCRINOLOGY | Facility: CLINIC | Age: 70
End: 2025-02-07

## 2025-02-07 ENCOUNTER — LAB (OUTPATIENT)
Dept: LAB | Facility: HOSPITAL | Age: 70
End: 2025-02-07
Payer: COMMERCIAL

## 2025-02-07 DIAGNOSIS — Z12.5 ENCOUNTER FOR SCREENING FOR MALIGNANT NEOPLASM OF PROSTATE: ICD-10-CM

## 2025-02-07 DIAGNOSIS — E23.0 HYPOGONADOTROPIC HYPOGONADISM IN MALE (HCC): ICD-10-CM

## 2025-02-07 DIAGNOSIS — E29.1 HYPOGONADISM IN MALE: ICD-10-CM

## 2025-02-07 LAB
ALBUMIN SERPL BCG-MCNC: 4 G/DL (ref 3.5–5)
ALP SERPL-CCNC: 97 U/L (ref 34–104)
ALT SERPL W P-5'-P-CCNC: 35 U/L (ref 7–52)
ANION GAP SERPL CALCULATED.3IONS-SCNC: 4 MMOL/L (ref 4–13)
AST SERPL W P-5'-P-CCNC: 23 U/L (ref 13–39)
BASOPHILS # BLD AUTO: 0.12 THOUSANDS/ΜL (ref 0–0.1)
BASOPHILS NFR BLD AUTO: 1 % (ref 0–1)
BILIRUB SERPL-MCNC: 0.72 MG/DL (ref 0.2–1)
BUN SERPL-MCNC: 17 MG/DL (ref 5–25)
CALCIUM SERPL-MCNC: 9.2 MG/DL (ref 8.4–10.2)
CHLORIDE SERPL-SCNC: 101 MMOL/L (ref 96–108)
CO2 SERPL-SCNC: 33 MMOL/L (ref 21–32)
CREAT SERPL-MCNC: 1.29 MG/DL (ref 0.6–1.3)
EOSINOPHIL # BLD AUTO: 0.49 THOUSAND/ΜL (ref 0–0.61)
EOSINOPHIL NFR BLD AUTO: 6 % (ref 0–6)
ERYTHROCYTE [DISTWIDTH] IN BLOOD BY AUTOMATED COUNT: 12.6 % (ref 11.6–15.1)
GFR SERPL CREATININE-BSD FRML MDRD: 55 ML/MIN/1.73SQ M
GLUCOSE P FAST SERPL-MCNC: 106 MG/DL (ref 65–99)
HCT VFR BLD AUTO: 52.7 % (ref 36.5–49.3)
HGB BLD-MCNC: 17.4 G/DL (ref 12–17)
IMM GRANULOCYTES # BLD AUTO: 0.03 THOUSAND/UL (ref 0–0.2)
IMM GRANULOCYTES NFR BLD AUTO: 0 % (ref 0–2)
LYMPHOCYTES # BLD AUTO: 2.33 THOUSANDS/ΜL (ref 0.6–4.47)
LYMPHOCYTES NFR BLD AUTO: 28 % (ref 14–44)
MCH RBC QN AUTO: 29.2 PG (ref 26.8–34.3)
MCHC RBC AUTO-ENTMCNC: 33 G/DL (ref 31.4–37.4)
MCV RBC AUTO: 88 FL (ref 82–98)
MONOCYTES # BLD AUTO: 0.77 THOUSAND/ΜL (ref 0.17–1.22)
MONOCYTES NFR BLD AUTO: 9 % (ref 4–12)
NEUTROPHILS # BLD AUTO: 4.65 THOUSANDS/ΜL (ref 1.85–7.62)
NEUTS SEG NFR BLD AUTO: 56 % (ref 43–75)
NRBC BLD AUTO-RTO: 0 /100 WBCS
PLATELET # BLD AUTO: 224 THOUSANDS/UL (ref 149–390)
PMV BLD AUTO: 9.6 FL (ref 8.9–12.7)
POTASSIUM SERPL-SCNC: 4.3 MMOL/L (ref 3.5–5.3)
PROT SERPL-MCNC: 6.9 G/DL (ref 6.4–8.4)
PSA SERPL-MCNC: 0.51 NG/ML (ref 0–4)
RBC # BLD AUTO: 5.96 MILLION/UL (ref 3.88–5.62)
SODIUM SERPL-SCNC: 138 MMOL/L (ref 135–147)
WBC # BLD AUTO: 8.39 THOUSAND/UL (ref 4.31–10.16)

## 2025-02-07 PROCEDURE — 36415 COLL VENOUS BLD VENIPUNCTURE: CPT

## 2025-02-07 PROCEDURE — 84402 ASSAY OF FREE TESTOSTERONE: CPT

## 2025-02-07 PROCEDURE — 85025 COMPLETE CBC W/AUTO DIFF WBC: CPT

## 2025-02-07 PROCEDURE — G0103 PSA SCREENING: HCPCS

## 2025-02-07 PROCEDURE — 80053 COMPREHEN METABOLIC PANEL: CPT

## 2025-02-07 PROCEDURE — 84403 ASSAY OF TOTAL TESTOSTERONE: CPT

## 2025-02-10 ENCOUNTER — OFFICE VISIT (OUTPATIENT)
Dept: ENDOCRINOLOGY | Facility: CLINIC | Age: 70
End: 2025-02-10
Payer: COMMERCIAL

## 2025-02-10 VITALS
HEIGHT: 70 IN | TEMPERATURE: 97 F | BODY MASS INDEX: 35.36 KG/M2 | OXYGEN SATURATION: 96 % | DIASTOLIC BLOOD PRESSURE: 70 MMHG | SYSTOLIC BLOOD PRESSURE: 120 MMHG | HEART RATE: 86 BPM | WEIGHT: 247 LBS

## 2025-02-10 DIAGNOSIS — E66.01 OBESITY, MORBID (HCC): ICD-10-CM

## 2025-02-10 DIAGNOSIS — E23.0 HYPOGONADOTROPIC HYPOGONADISM IN MALE (HCC): Primary | ICD-10-CM

## 2025-02-10 DIAGNOSIS — I10 PRIMARY HYPERTENSION: ICD-10-CM

## 2025-02-10 DIAGNOSIS — D75.1 POLYCYTHEMIA: ICD-10-CM

## 2025-02-10 DIAGNOSIS — G47.33 OSA (OBSTRUCTIVE SLEEP APNEA): ICD-10-CM

## 2025-02-10 DIAGNOSIS — Z86.2 HISTORY OF POLYCYTHEMIA: ICD-10-CM

## 2025-02-10 PROCEDURE — 99214 OFFICE O/P EST MOD 30 MIN: CPT | Performed by: STUDENT IN AN ORGANIZED HEALTH CARE EDUCATION/TRAINING PROGRAM

## 2025-02-10 PROCEDURE — G2211 COMPLEX E/M VISIT ADD ON: HCPCS | Performed by: STUDENT IN AN ORGANIZED HEALTH CARE EDUCATION/TRAINING PROGRAM

## 2025-02-10 RX ORDER — TESTOSTERONE 20.25 MG/1.25G
GEL TOPICAL
Qty: 75 G | Refills: 3 | Status: SHIPPED | OUTPATIENT
Start: 2025-02-10

## 2025-02-10 NOTE — PROGRESS NOTES
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 Pt is agreeable. Will plan reassessment of labs in 3-mo  Orders:  •  CBC and differential; Future  •  Comprehensive metabolic panel; Future  •  Testosterone, free, total; Future  •  Testosterone 1.62 % GEL; Apply 1 pump press to one upper arm and shoulder once daily in the morning    FRANDY (obstructive sleep apnea)         Primary hypertension  Good control        Polycythemia  We will reduce androgel 1 pump daily due to high Hct (<54%), and we will re-assess in 3-mo        History of polycythemia    Orders:  •  CBC and differential; Future    Obesity, morbid (HCC)           RTC 3-mo    History of Present Illness   HPI  Jose Hopkins is a 69 y.o. male who presents in follow up of hypogonadotropic hypogonadism. He is on androgel 40 mg daily. This was increased from last visit. He noted increased urinary urgency, worsening stream. He also notes his blood pressure has been more elevated. He mentions hypogonadal sx no significantly affected by 1 vs 2 pump daily dosing of androgel, however he does not improvements in domains of sexual health on therapy as opposed to not.     History obtained from: patient and patient's Significant Other    Review of Systems   All other systems reviewed and are negative.         Objective   /70 (BP Location: Left arm, Patient Position: Sitting, Cuff Size: Large)   Pulse 86   Temp (!) 97 °F (36.1 °C)   Ht 5' 10\" (1.778 m)   Wt 112 kg (247 lb)   SpO2 96%   BMI 35.44 kg/m²      Physical Exam  Vitals reviewed.   Constitutional:       General: He is not in acute distress.     Appearance: Normal appearance.   HENT:      Head: Normocephalic and " atraumatic.      Nose: Nose normal.   Eyes:      General: No scleral icterus.     Conjunctiva/sclera: Conjunctivae normal.   Pulmonary:      Effort: Pulmonary effort is normal. No respiratory distress.   Musculoskeletal:         General: No deformity.      Cervical back: Normal range of motion.   Skin:     General: Skin is warm and dry.   Neurological:      Mental Status: He is alert.   Psychiatric:         Mood and Affect: Mood normal.         Behavior: Behavior normal.         Component      Latest Ref Rn 2/7/2025   WBC      4.31 - 10.16 Thousand/uL 8.39    RBC      3.88 - 5.62 Million/uL 5.96 (H)    Hemoglobin      12.0 - 17.0 g/dL 17.4 (H)    Hematocrit      36.5 - 49.3 % 52.7 (H)    MCV      82 - 98 fL 88    MCH      26.8 - 34.3 pg 29.2    MCHC      31.4 - 37.4 g/dL 33.0    RDW      11.6 - 15.1 % 12.6    MPV      8.9 - 12.7 fL 9.6    Platelet Count      149 - 390 Thousands/uL 224    nRBC      /100 WBCs 0    Segmented %      43 - 75 % 56    Immature Grans %      0 - 2 % 0    Lymphocytes %      14 - 44 % 28    Monocytes %      4 - 12 % 9    Eosinophils %      0 - 6 % 6    Basophils %      0 - 1 % 1    Absolute Neutrophils      1.85 - 7.62 Thousands/µL 4.65    Absolute Immature Grans      0.00 - 0.20 Thousand/uL 0.03    Absolute Lymphocytes      0.60 - 4.47 Thousands/µL 2.33    Absolute Monocytes      0.17 - 1.22 Thousand/µL 0.77    Absolute Eosinophils      0.00 - 0.61 Thousand/µL 0.49    Absolute Basophils      0.00 - 0.10 Thousands/µL 0.12 (H)    Sodium      135 - 147 mmol/L 138    Potassium      3.5 - 5.3 mmol/L 4.3    Chloride      96 - 108 mmol/L 101    Carbon Dioxide      21 - 32 mmol/L 33 (H)    ANION GAP      4 - 13 mmol/L 4    BUN      5 - 25 mg/dL 17    Creatinine      0.60 - 1.30 mg/dL 1.29    GLUCOSE, FASTING      65 - 99 mg/dL 106 (H)    Calcium      8.4 - 10.2 mg/dL 9.2    AST      13 - 39 U/L 23    ALT      7 - 52 U/L 35    ALK PHOS      34 - 104 U/L 97    Total Protein      6.4 - 8.4 g/dL 6.9     Albumin      3.5 - 5.0 g/dL 4.0    Total Bilirubin      0.20 - 1.00 mg/dL 0.72    GFR, Calculated      ml/min/1.73sq m 55    PSA      0.000 - 4.000 ng/mL 0.505

## 2025-02-10 NOTE — ASSESSMENT & PLAN NOTE
We will reduce androgel to 1 pump daily due to polycythemia. Pt is agreeable. Will plan reassessment of labs in 3-mo  Orders:  •  CBC and differential; Future  •  Comprehensive metabolic panel; Future  •  Testosterone, free, total; Future  •  Testosterone 1.62 % GEL; Apply 1 pump press to one upper arm and shoulder once daily in the morning   Dry/Cool

## 2025-02-11 LAB
TESTOST FREE SERPL-MCNC: 6.8 PG/ML (ref 6.6–18.1)
TESTOST SERPL-MCNC: 339 NG/DL (ref 264–916)

## 2025-03-13 ENCOUNTER — TELEPHONE (OUTPATIENT)
Age: 70
End: 2025-03-13

## 2025-03-13 DIAGNOSIS — R35.1 BENIGN PROSTATIC HYPERPLASIA WITH NOCTURIA: ICD-10-CM

## 2025-03-13 DIAGNOSIS — N40.1 BENIGN PROSTATIC HYPERPLASIA WITH NOCTURIA: ICD-10-CM

## 2025-03-13 RX ORDER — TADALAFIL 5 MG/1
5 TABLET ORAL DAILY
Qty: 90 TABLET | Refills: 3 | Status: CANCELLED | OUTPATIENT
Start: 2025-03-13

## 2025-03-13 NOTE — TELEPHONE ENCOUNTER
Pt calling in regards to prescription for tadalafil 5mg.    Pt is requesting to  a paper script for medication from the office as he has found it is less expensive to pay out of pocket and go through [facility] for prescription. Please review.     Pt cb- [phone]

## 2025-03-14 RX ORDER — TADALAFIL 5 MG/1
5 TABLET ORAL DAILY
Qty: 90 TABLET | Refills: 3 | Status: SHIPPED | OUTPATIENT
Start: 2025-03-14 | End: 2025-03-17

## 2025-03-17 DIAGNOSIS — N40.1 BENIGN PROSTATIC HYPERPLASIA WITH NOCTURIA: ICD-10-CM

## 2025-03-17 DIAGNOSIS — R35.1 BENIGN PROSTATIC HYPERPLASIA WITH NOCTURIA: ICD-10-CM

## 2025-03-17 RX ORDER — TADALAFIL 5 MG/1
5 TABLET ORAL DAILY
Qty: 90 TABLET | Refills: 3 | Status: CANCELLED | OUTPATIENT
Start: 2025-03-17

## 2025-03-17 NOTE — TELEPHONE ENCOUNTER
Pt calling back to see if he is able to  paper script for Tadalafil 5mg at the  today. Please review.     Pt cb- [phone]

## 2025-03-19 RX ORDER — TADALAFIL 5 MG/1
5 TABLET ORAL DAILY
Qty: 90 TABLET | Refills: 3 | Status: SHIPPED | OUTPATIENT
Start: 2025-03-19

## 2025-03-19 NOTE — TELEPHONE ENCOUNTER
Pt called due to script not sent to [facility]. CTS team is assisting on getting medication sent to proper pharmacy

## 2025-03-24 DIAGNOSIS — R35.1 BENIGN PROSTATIC HYPERPLASIA WITH NOCTURIA: ICD-10-CM

## 2025-03-24 DIAGNOSIS — N40.1 BENIGN PROSTATIC HYPERPLASIA WITH NOCTURIA: ICD-10-CM

## 2025-03-25 ENCOUNTER — TELEPHONE (OUTPATIENT)
Age: 70
End: 2025-03-25

## 2025-03-25 RX ORDER — TADALAFIL 5 MG/1
5 TABLET ORAL EVERY MORNING
Qty: 90 TABLET | Refills: 0 | Status: SHIPPED | OUTPATIENT
Start: 2025-03-25

## 2025-03-25 NOTE — TELEPHONE ENCOUNTER
PA for TADALAFIL 5 MG  DENIED    Reason:(Screenshot if applicable) TRIAL AND FAILURE OF    Finasteride, Dutasteride and Tamsulosin Aflusozin    Pt to use good rx  Pharmacy made aware of denial

## 2025-04-24 ENCOUNTER — EVALUATION (OUTPATIENT)
Dept: PHYSICAL THERAPY | Facility: CLINIC | Age: 70
End: 2025-04-24
Payer: COMMERCIAL

## 2025-04-24 DIAGNOSIS — M15.9 GENERALIZED OSTEOARTHROSIS, INVOLVING MULTIPLE SITES: ICD-10-CM

## 2025-04-24 DIAGNOSIS — R07.82 INTERCOSTAL PAIN: Primary | ICD-10-CM

## 2025-04-24 PROCEDURE — 97535 SELF CARE MNGMENT TRAINING: CPT

## 2025-04-24 PROCEDURE — 97140 MANUAL THERAPY 1/> REGIONS: CPT

## 2025-04-24 PROCEDURE — 97162 PT EVAL MOD COMPLEX 30 MIN: CPT

## 2025-04-24 NOTE — LETTER
2025    ALMA Bloom  [address] [facility] [facility] [address]    Patient: Jose Hopkins   YOB: 1955   Date of Visit: 2025     Encounter Diagnosis     ICD-10-CM    1. Intercostal pain  R07.82       2. Generalized osteoarthrosis, involving multiple sites  M15.9           Dear ALMA Fleming:    Thank you for your recent referral of Jose Hopkins. Please review the attached evaluation summary from Jose's recent visit.     Please verify that you agree with the plan of care by signing the attached order.     If you have any questions or concerns, please do not hesitate to call.     I sincerely appreciate the opportunity to share in the care of one of your patients and hope to have another opportunity to work with you in the near future.       Sincerely,    Sania Cota, PT      Referring Provider:      I certify that I have read the below Plan of Care and certify the need for these services furnished under this plan of treatment while under my care.                    ALMA Bloom  [address] [facility] [address]  Via Fax: [phone]          PT Evaluation     Today's date: 2025  Patient name: Jose Hopkins  : 1955  MRN: [id number]  Referring provider: Graciela Tellez CR*  Dx:   Encounter Diagnosis     ICD-10-CM    1. Intercostal pain  R07.82       2. Generalized osteoarthrosis, involving multiple sites  M15.9           Start Time: 1410  Stop Time: 1500  Total time in clinic (min): 50 minutes    Assessment  Impairments: abnormal or restricted ROM, activity intolerance, impaired physical strength, lacks appropriate home exercise program, pain with function, weight-bearing intolerance, participation limitations and activity limitations    Assessment details: Patient is a 70 y/o male presenting to OP PT w/ c/o R rib cage and low back pain. Upon evaluation, he presented with some lumbar mobility deficits, hypomobility within  thoracic spine with pain recreation with palpation of intercostals of ribs 7-10 on the R, decreased rotation on the R in thoracic spine, and radicular symptoms into LLE. Patient had improvement in symptoms with extension based exercises, reporting no radicular pain in LLE and had some recreation of pain in rib cage with thoracic rotation. Patient was educated HEP and would benefit from skilled PT to improve mobility and strength to reduce functional impairments.     Goals  STG to be met within 4 weeks:  1. Pt will increase BLE strength by 1/2 muscle grade to improve functional mobility and reduce pain.   2. Pt will be independent with HEP to decrease pain and improve quality of life.   3. Pt will report 3/10 pain at worst to improve functional mobility and quality of life.   4. Pt will increase FOTO score by 10 points in order to improve lumbar spine mobility and reduce pain with function.     LTG to be met within 8 weeks:  1. Pt will improve BLE strength to WNL to improve functional mobility and quality of life.   2. Pt will restore lumbar spine AROM to WNL to improve functional mobility and quality of life.   3. Pt will meet FOTO score goal at DC to improve lumbar spine mobility in pain free range.        Plan  Patient would benefit from: skilled physical therapy and PT eval  Planned modality interventions: cryotherapy and thermotherapy: hydrocollator packs    Planned therapy interventions: flexibility, functional ROM exercises, home exercise program, joint mobilization, manual therapy, neuromuscular re-education, patient/caregiver education, self care, strengthening, stretching and therapeutic exercise    Frequency: 2x week  Duration in weeks: 6  Treatment plan discussed with: patient        Subjective Evaluation    History of Present Illness  Mechanism of injury: Patient reports about 4 months ago, he was lifting several heavy objects and then eventually had pain within his R rib cage. He states initially the  "pain was a 9-10/10 making it difficult for him to complete even simple ADLs. Patient was concerned about the possibility of this being something systemic but he states he has had gradual improvement in his symptoms since onset. Patient reports he is now only getting pain with certain movements like twisting or lifting heavy objects. He also reports he gets low back pain and L sided radicular symptoms but contributes this to an old injury he sustained to his L foot. He believes he began having low back pain due to gait compensations and has not really gone back to \"normal\" since. He did try to see the chiropractor for 6 weeks prior to starting PT and got no relief in ribcage pain and only minimal relief in low back pain.   Patient Goals  Patient goals for therapy: increased motion, decreased pain, increased strength and return to sport/leisure activities    Pain  Current pain ratin  At best pain ratin  At worst pain rating: 3  Quality: radiating, dull ache and sharp  Relieving factors: ice, heat, medications and support  Aggravating factors: lifting and overhead activity  Progression: improved    Treatments  Previous treatment: chiropractic  Current treatment: physical therapy      Objective     Palpation   Left   Tenderness of the lumbar paraspinals.     Right   Tenderness of the lumbar paraspinals.     Additional Palpation Details  Intercostal pain between ribs 7-10 on R side    Neurological Testing     Sensation     Lumbar   Left   Intact: light touch    Right   Intact: light touch    Reflexes   Left   Patellar (L4): normal (2+)  Achilles (S1): normal (2+)    Right   Patellar (L4): normal (2+)  Achilles (S1): normal (2+)    Active Range of Motion     Lumbar   Flexion:  WFL  Extension: 15 degrees   Left lateral flexion:  Restriction level: minimal  Right lateral flexion:  Restriction level: minimal  Left rotation:  Restriction level: minimal  Right rotation:  Restriction level: minimal    Joint Play "     Hypomobile: T8, T9, T10, T11, T12, L1, L2, L3, L4 and L5     Strength/Myotome Testing     Left Hip   Planes of Motion   Flexion: 4-  Extension: 4-  Abduction: 4  Adduction: 4  External rotation: 4  Internal rotation: 4    Right Hip   Planes of Motion   Flexion: 4+  Extension: 4  Abduction: 4  Adduction: 4  External rotation: 4  Internal rotation: 4    Tests     Lumbar     Left   Positive passive SLR and slump test.   Negative crossed SLR.     Right   Positive crossed SLR.   Negative passive SLR and slump test.     Left Pelvic Girdle/Sacrum   Negative: active SLR test.     Right Pelvic Girdle/Sacrum   Negative: active SLR test.     Left Hip   Positive STEPHANIE and FADIR.     Right Hip   Negative STEPHANIE and FADIR.              Precautions: PMH BPH, Fatty Liver, CPAP      Manuals 4/24       Thoracic PA mobs         LLE stretching         SIJ check 10' R posterior correction               Neuro Re-Ed        SL open books         Seated rotation c weighted ball        Seated around the world        Bilateral D2 flexion c breathing        PPU        Prone OAL                Ther Ex        Nustep for ROM/strength        Bridge c ABD        Clamshells        PPT        LTR                        HEP education and instruction 15'       Ther Activity                        Gait Training                        Modalities        CP

## 2025-04-24 NOTE — PROGRESS NOTES
PT Evaluation     Today's date: 2025  Patient name: Jose Hopkins  : 1955  MRN: [id number]  Referring provider: Graciela Tellez CR*  Dx:   Encounter Diagnosis     ICD-10-CM    1. Intercostal pain  R07.82       2. Generalized osteoarthrosis, involving multiple sites  M15.9           Start Time: 1410  Stop Time: 1500  Total time in clinic (min): 50 minutes    Assessment  Impairments: abnormal or restricted ROM, activity intolerance, impaired physical strength, lacks appropriate home exercise program, pain with function, weight-bearing intolerance, participation limitations and activity limitations    Assessment details: Patient is a 70 y/o male presenting to OP PT w/ c/o R rib cage and low back pain. Upon evaluation, he presented with some lumbar mobility deficits, hypomobility within thoracic spine with pain recreation with palpation of intercostals of ribs 7-10 on the R, decreased rotation on the R in thoracic spine, and radicular symptoms into LLE. Patient had improvement in symptoms with extension based exercises, reporting no radicular pain in LLE and had some recreation of pain in rib cage with thoracic rotation. Patient was educated HEP and would benefit from skilled PT to improve mobility and strength to reduce functional impairments.     Goals  STG to be met within 4 weeks:  1. Pt will increase BLE strength by 1/2 muscle grade to improve functional mobility and reduce pain.   2. Pt will be independent with HEP to decrease pain and improve quality of life.   3. Pt will report 3/10 pain at worst to improve functional mobility and quality of life.   4. Pt will increase FOTO score by 10 points in order to improve lumbar spine mobility and reduce pain with function.     LTG to be met within 8 weeks:  1. Pt will improve BLE strength to WNL to improve functional mobility and quality of life.   2. Pt will restore lumbar spine AROM to WNL to improve functional mobility and quality of life.   3. Pt  "will meet FOTO score goal at [place] to improve lumbar spine mobility in pain free range.        Plan  Patient would benefit from: skilled physical therapy and PT eval  Planned modality interventions: cryotherapy and thermotherapy: hydrocollator packs    Planned therapy interventions: flexibility, functional ROM exercises, home exercise program, joint mobilization, manual therapy, neuromuscular re-education, patient/caregiver education, self care, strengthening, stretching and therapeutic exercise    Frequency: 2x week  Duration in weeks: 6  Treatment plan discussed with: patient        Subjective Evaluation    History of Present Illness  Mechanism of injury: Patient reports about 4 months ago, he was lifting several heavy objects and then eventually had pain within his R rib cage. He states initially the pain was a 9-10/10 making it difficult for him to complete even simple ADLs. Patient was concerned about the possibility of this being something systemic but he states he has had gradual improvement in his symptoms since onset. Patient reports he is now only getting pain with certain movements like twisting or lifting heavy objects. He also reports he gets low back pain and L sided radicular symptoms but contributes this to an old injury he sustained to his L foot. He believes he began having low back pain due to gait compensations and has not really gone back to \"normal\" since. He did try to see the chiropractor for 6 weeks prior to starting PT and got no relief in ribcage pain and only minimal relief in low back pain.   Patient Goals  Patient goals for therapy: increased motion, decreased pain, increased strength and return to sport/leisure activities    Pain  Current pain ratin  At best pain ratin  At worst pain rating: 3  Quality: radiating, dull ache and sharp  Relieving factors: ice, heat, medications and support  Aggravating factors: lifting and overhead activity  Progression: " improved    Treatments  Previous treatment: chiropractic  Current treatment: physical therapy      Objective     Palpation   Left   Tenderness of the lumbar paraspinals.     Right   Tenderness of the lumbar paraspinals.     Additional Palpation Details  Intercostal pain between ribs 7-10 on R side    Neurological Testing     Sensation     Lumbar   Left   Intact: light touch    Right   Intact: light touch    Reflexes   Left   Patellar (L4): normal (2+)  Achilles (S1): normal (2+)    Right   Patellar (L4): normal (2+)  Achilles (S1): normal (2+)    Active Range of Motion     Lumbar   Flexion:  WFL  Extension: 15 degrees   Left lateral flexion:  Restriction level: minimal  Right lateral flexion:  Restriction level: minimal  Left rotation:  Restriction level: minimal  Right rotation:  Restriction level: minimal    Joint Play     Hypomobile: T8, T9, T10, T11, T12, L1, L2, L3, L4 and L5     Strength/Myotome Testing     Left Hip   Planes of Motion   Flexion: 4-  Extension: 4-  Abduction: 4  Adduction: 4  External rotation: 4  Internal rotation: 4    Right Hip   Planes of Motion   Flexion: 4+  Extension: 4  Abduction: 4  Adduction: 4  External rotation: 4  Internal rotation: 4    Tests     Lumbar     Left   Positive passive SLR and slump test.   Negative crossed SLR.     Right   Positive crossed SLR.   Negative passive SLR and slump test.     Left Pelvic Girdle/Sacrum   Negative: active SLR test.     Right Pelvic Girdle/Sacrum   Negative: active SLR test.     Left Hip   Positive STEPHANIE and FADIR.     Right Hip   Negative STEPHANIE and FADIR.              Precautions: PMH BPH, Fatty Liver, CPAP      Manuals 4/24       Thoracic PA mobs         LLE stretching         SIJ check 10' R posterior correction               Neuro Re-Ed        SL open books         Seated rotation c weighted ball        Seated around the world        Bilateral D2 flexion c breathing        PPU        Prone OAL                Ther Ex        Nustep for  ROM/strength        Bridge c ABD        Amado        PPT        LTR                        HEP education and instruction 15'       Ther Activity                        Gait Training                        Modalities        CP

## 2025-04-28 ENCOUNTER — OFFICE VISIT (OUTPATIENT)
Dept: PHYSICAL THERAPY | Facility: CLINIC | Age: 70
End: 2025-04-28
Payer: COMMERCIAL

## 2025-04-28 DIAGNOSIS — R07.82 INTERCOSTAL PAIN: Primary | ICD-10-CM

## 2025-04-28 DIAGNOSIS — M15.9 GENERALIZED OSTEOARTHROSIS, INVOLVING MULTIPLE SITES: ICD-10-CM

## 2025-04-28 PROCEDURE — 97112 NEUROMUSCULAR REEDUCATION: CPT

## 2025-04-28 PROCEDURE — 97140 MANUAL THERAPY 1/> REGIONS: CPT

## 2025-04-28 PROCEDURE — 97110 THERAPEUTIC EXERCISES: CPT

## 2025-04-28 NOTE — PROGRESS NOTES
Daily Note     Today's date: 2025  Patient name: Jose Hopkins  : 1955  MRN: [id number]  Referring provider: Graciela Tellez CR*  Dx:   Encounter Diagnosis     ICD-10-CM    1. Intercostal pain  R07.82       2. Generalized osteoarthrosis, involving multiple sites  M15.9           Start Time: 1600  Stop Time: 1700  Total time in clinic (min): 60 minutes    Subjective: Patient reports he has been doing a lot of gardening and prep for vacation so he is sore today. He also reports his sciatica has been a little worse due to how active he has been.       Objective: See treatment diary below      Assessment: Tolerated treatment well. Patient did not have recreation of sciatic nerve pain in LLE throughout session and did well with thoracic mobility exercises. Patient demonstrated fatigue post treatment, exhibited good technique with therapeutic exercises, and would benefit from continued PT to improve core strength and spinal stability to reduce pain with functional tasks.      Plan: Continue per plan of care.      Precautions: PMH BPH, Fatty Liver, CPAP  POC Expiration: 25    Manuals       Thoracic PA mobs   10' c lumbar mobs      LLE stretching   10'      SIJ check 10' R posterior correction Negative               Neuro Re-Ed        SL open books   10x ea bilat       Seated rotation c weighted ball  10x bilat soccer ball      Seated around the world        Bilateral D2 flexion c breathing        PPU  2x10       Prone OAL  10x ea bilat               Ther Ex        Nustep for ROM/strength  10' L1      Bridge c ABD  2x10 GTB      Clamshells  2x10 ea bilat GTB      PPT        LTR                        HEP education and instruction 15'       Ther Activity                        Gait Training                        Modalities        CP  MHP mid/low back in prone 10'

## 2025-05-12 ENCOUNTER — APPOINTMENT (OUTPATIENT)
Dept: LAB | Facility: HOSPITAL | Age: 70
End: 2025-05-12
Payer: COMMERCIAL

## 2025-05-12 ENCOUNTER — OFFICE VISIT (OUTPATIENT)
Dept: PHYSICAL THERAPY | Facility: CLINIC | Age: 70
End: 2025-05-12
Payer: COMMERCIAL

## 2025-05-12 DIAGNOSIS — R07.82 INTERCOSTAL PAIN: Primary | ICD-10-CM

## 2025-05-12 DIAGNOSIS — M15.9 GENERALIZED OSTEOARTHROSIS, INVOLVING MULTIPLE SITES: ICD-10-CM

## 2025-05-12 DIAGNOSIS — E23.0 HYPOGONADOTROPIC HYPOGONADISM IN MALE (HCC): ICD-10-CM

## 2025-05-12 DIAGNOSIS — Z86.2 HISTORY OF POLYCYTHEMIA: ICD-10-CM

## 2025-05-12 LAB
ALBUMIN SERPL BCG-MCNC: 3.9 G/DL (ref 3.5–5)
ALP SERPL-CCNC: 91 U/L (ref 34–104)
ALT SERPL W P-5'-P-CCNC: 31 U/L (ref 7–52)
ANION GAP SERPL CALCULATED.3IONS-SCNC: 5 MMOL/L (ref 4–13)
AST SERPL W P-5'-P-CCNC: 23 U/L (ref 13–39)
BASOPHILS # BLD AUTO: 0.09 THOUSANDS/ÂΜL (ref 0–0.1)
BASOPHILS NFR BLD AUTO: 1 % (ref 0–1)
BILIRUB SERPL-MCNC: 0.56 MG/DL (ref 0.2–1)
BUN SERPL-MCNC: 22 MG/DL (ref 5–25)
CALCIUM SERPL-MCNC: 8.9 MG/DL (ref 8.4–10.2)
CHLORIDE SERPL-SCNC: 104 MMOL/L (ref 96–108)
CO2 SERPL-SCNC: 30 MMOL/L (ref 21–32)
CREAT SERPL-MCNC: 1.38 MG/DL (ref 0.6–1.3)
EOSINOPHIL # BLD AUTO: 0.5 THOUSAND/ÂΜL (ref 0–0.61)
EOSINOPHIL NFR BLD AUTO: 6 % (ref 0–6)
ERYTHROCYTE [DISTWIDTH] IN BLOOD BY AUTOMATED COUNT: 12.7 % (ref 11.6–15.1)
GFR SERPL CREATININE-BSD FRML MDRD: 51 ML/MIN/1.73SQ M
GLUCOSE P FAST SERPL-MCNC: 90 MG/DL (ref 65–99)
HCT VFR BLD AUTO: 50.6 % (ref 36.5–49.3)
HGB BLD-MCNC: 16.4 G/DL (ref 12–17)
IMM GRANULOCYTES # BLD AUTO: 0.03 THOUSAND/UL (ref 0–0.2)
IMM GRANULOCYTES NFR BLD AUTO: 0 % (ref 0–2)
LYMPHOCYTES # BLD AUTO: 1.75 THOUSANDS/ÂΜL (ref 0.6–4.47)
LYMPHOCYTES NFR BLD AUTO: 23 % (ref 14–44)
MCH RBC QN AUTO: 29.3 PG (ref 26.8–34.3)
MCHC RBC AUTO-ENTMCNC: 32.4 G/DL (ref 31.4–37.4)
MCV RBC AUTO: 90 FL (ref 82–98)
MONOCYTES # BLD AUTO: 0.84 THOUSAND/ÂΜL (ref 0.17–1.22)
MONOCYTES NFR BLD AUTO: 11 % (ref 4–12)
NEUTROPHILS # BLD AUTO: 4.55 THOUSANDS/ÂΜL (ref 1.85–7.62)
NEUTS SEG NFR BLD AUTO: 59 % (ref 43–75)
NRBC BLD AUTO-RTO: 0 /100 WBCS
PLATELET # BLD AUTO: 203 THOUSANDS/UL (ref 149–390)
PMV BLD AUTO: 10.6 FL (ref 8.9–12.7)
POTASSIUM SERPL-SCNC: 4.1 MMOL/L (ref 3.5–5.3)
PROT SERPL-MCNC: 6.6 G/DL (ref 6.4–8.4)
RBC # BLD AUTO: 5.6 MILLION/UL (ref 3.88–5.62)
SODIUM SERPL-SCNC: 139 MMOL/L (ref 135–147)
WBC # BLD AUTO: 7.76 THOUSAND/UL (ref 4.31–10.16)

## 2025-05-12 PROCEDURE — 97112 NEUROMUSCULAR REEDUCATION: CPT

## 2025-05-12 PROCEDURE — 97110 THERAPEUTIC EXERCISES: CPT

## 2025-05-12 PROCEDURE — 85025 COMPLETE CBC W/AUTO DIFF WBC: CPT

## 2025-05-12 PROCEDURE — 84403 ASSAY OF TOTAL TESTOSTERONE: CPT

## 2025-05-12 PROCEDURE — 84402 ASSAY OF FREE TESTOSTERONE: CPT

## 2025-05-12 PROCEDURE — 80053 COMPREHEN METABOLIC PANEL: CPT

## 2025-05-12 PROCEDURE — 36415 COLL VENOUS BLD VENIPUNCTURE: CPT

## 2025-05-12 PROCEDURE — 97140 MANUAL THERAPY 1/> REGIONS: CPT

## 2025-05-12 NOTE — PROGRESS NOTES
Daily Note     Today's date: 2025  Patient name: Jose Hopkins  : 1955  MRN: [id number]  Referring provider: Graciela Tellez CR*  Dx:   Encounter Diagnosis     ICD-10-CM    1. Intercostal pain  R07.82       2. Generalized osteoarthrosis, involving multiple sites  M15.9           Start Time: 1540  Stop Time: 1650  Total time in clinic (min): 70 minutes    Subjective: Patient reports he was doing yard work before coming to therapy and is a little sore. He said generally, he does not have much pain throughout exercises but pays for it afterwards.       Objective: See treatment diary below      Assessment: Tolerated treatment well. Patient had improvements in thoracic and rib cage mobility post interventions and reported improvement in symptoms. Patient demonstrated fatigue post treatment, exhibited good technique with therapeutic exercises, and would benefit from continued PT to improve thoracic mobility and core strength to improve functional mobility.      Plan: Continue per plan of care.      Precautions: PMH BPH, Fatty Liver, CPAP  POC Expiration: 25    Manuals      Thoracic PA mobs   10' c lumbar mobs      LLE stretching   10' 15'      SIJ check 10' R posterior correction Negative               Neuro Re-Ed        SL open books   10x ea bilat  10x ea bilat      Seated rotation c weighted ball  10x bilat soccer ball 10x bilat YWB     Seated around the world        Bilateral D2 flexion c breathing        PPU  2x10  2x10 c exhale     Prone OAL  10x ea bilat  10x ea bilat              Ther Ex        Nustep for ROM/strength  10' L1 10' L3     Bridge c ABD  2x10 GTB 2x10 GTB     Clamshells  2x10 ea bilat GTB 2x10 ea bilat GTB      PPT        LTR                        HEP education and instruction 15'       Ther Activity                        Gait Training                        Modalities        CP  MHP mid/low back in prone 10' MHP to low back 15'

## 2025-05-15 ENCOUNTER — OFFICE VISIT (OUTPATIENT)
Dept: PHYSICAL THERAPY | Facility: CLINIC | Age: 70
End: 2025-05-15
Payer: COMMERCIAL

## 2025-05-15 DIAGNOSIS — M15.9 GENERALIZED OSTEOARTHROSIS, INVOLVING MULTIPLE SITES: ICD-10-CM

## 2025-05-15 DIAGNOSIS — R07.82 INTERCOSTAL PAIN: Primary | ICD-10-CM

## 2025-05-15 PROCEDURE — 97110 THERAPEUTIC EXERCISES: CPT

## 2025-05-15 PROCEDURE — 97140 MANUAL THERAPY 1/> REGIONS: CPT

## 2025-05-15 PROCEDURE — 97112 NEUROMUSCULAR REEDUCATION: CPT

## 2025-05-15 NOTE — PROGRESS NOTES
Daily Note     Today's date: 5/15/2025  Patient name: Jose Hopkins  : 1955  MRN: [id number]  Referring provider: Graciela Tellez CR*  Dx:   Encounter Diagnosis     ICD-10-CM    1. Intercostal pain  R07.82       2. Generalized osteoarthrosis, involving multiple sites  M15.9           Start Time: 1555  Stop Time: 1655  Total time in clinic (min): 60 minutes    Subjective: Patient reports he is feeling improvements with PT but still has some discomfort within R sided rib cage.      Objective: See treatment diary below      Assessment: Tolerated treatment well. Patient was instructed through thoracic mobility HEP to improve upon mid back pain with rotational movements. Patient tolerated all interventions well with reports of improved symptoms post session. Patient demonstrated fatigue post treatment, exhibited good technique with therapeutic exercises, and would benefit from continued PT to improve thoracic and lumbar mobility to reduce functional impairments.       Plan: Continue per plan of care.      Precautions: PMH BPH, Fatty Liver, CPAP  POC Expiration: 25    Manuals 4/24 4/28 5/12 5/15    Thoracic PA mobs   10' c lumbar mobs  15' c STM to R lower t-spine/ribs    LLE stretching   10' 15'      SIJ check 10' R posterior correction Negative               Neuro Re-Ed        SL open books   10x ea bilat  10x ea bilat  10x ea bilat     Seated rotation c weighted ball  10x bilat soccer ball 10x bilat YWB     Prone scap stabilization    10x     Prone TY    10x ea     PPU  2x10  2x10 c exhale 10x     Prone OAL  10x ea bilat  10x ea bilat      Around the [place] at wall    10x ea bilat     Standing thoracic rotation    10x ea bilat     Thread the needle    10x ea bilat     Ther Ex        Nustep for ROM/strength  10' L1 10' L3 10' L3    Bridge c ABD  2x10 GTB 2x10 GTB     Clamshells  2x10 ea bilat GTB 2x10 ea bilat GTB      PPT        LTR                        HEP education and instruction 15'   5'     Ther Activity                        Gait Training                        Modalities        CP  MHP mid/low back in prone 10' MHP to low back 15' MHP to low back 10'

## 2025-05-17 LAB
TESTOST FREE SERPL-MCNC: 4.2 PG/ML (ref 6.6–18.1)
TESTOST SERPL-MCNC: 256 NG/DL (ref 264–916)

## 2025-05-19 ENCOUNTER — OFFICE VISIT (OUTPATIENT)
Dept: PHYSICAL THERAPY | Facility: CLINIC | Age: 70
End: 2025-05-19
Payer: COMMERCIAL

## 2025-05-19 ENCOUNTER — APPOINTMENT (OUTPATIENT)
Dept: PHYSICAL THERAPY | Facility: CLINIC | Age: 70
End: 2025-05-19
Payer: COMMERCIAL

## 2025-05-19 ENCOUNTER — RESULTS FOLLOW-UP (OUTPATIENT)
Dept: ENDOCRINOLOGY | Facility: CLINIC | Age: 70
End: 2025-05-19

## 2025-05-19 DIAGNOSIS — R07.82 INTERCOSTAL PAIN: Primary | ICD-10-CM

## 2025-05-19 DIAGNOSIS — M15.9 GENERALIZED OSTEOARTHROSIS, INVOLVING MULTIPLE SITES: ICD-10-CM

## 2025-05-19 PROCEDURE — 97110 THERAPEUTIC EXERCISES: CPT

## 2025-05-19 PROCEDURE — 97140 MANUAL THERAPY 1/> REGIONS: CPT

## 2025-05-19 PROCEDURE — 97112 NEUROMUSCULAR REEDUCATION: CPT

## 2025-05-19 NOTE — PROGRESS NOTES
Daily Note     Today's date: 2025  Patient name: Jose Hopkins  : 1955  MRN: [id number]  Referring provider: Graciela Tellez CR*  Dx:   Encounter Diagnosis     ICD-10-CM    1. Intercostal pain  R07.82       2. Generalized osteoarthrosis, involving multiple sites  M15.9                      Subjective: Patient states that his back is doing better and he is making progress with OP PT.       Objective: See treatment diary below      Assessment: Patient tolerated treatment well with continued steady progress towards functional strength and mobility goals. Patient exhibited good technique with therapeutic exercises and would benefit from continued skilled PT services to address ongoing impairments.        Plan: Continue per plan of care.      Precautions: PMH BPH, Fatty Liver, CPAP  POC Expiration: 25    Manuals 4/24 4/28 5/12 5/15 5/19   Thoracic PA mobs   10' c lumbar mobs  15' c STM to R lower t-spine/ribs 15' c STM to R lower t-spine/ribs   LLE stretching   10' 15'      SIJ check 10' R posterior correction Negative               Neuro Re-Ed        SL open books   10x ea bilat  10x ea bilat  10x ea bilat  10x ea bilat   Seated rotation c weighted ball  10x bilat soccer ball 10x bilat YWB     Prone scap stabilization    10x  10x   Prone TY    10x ea  10x ea   PPU  2x10  2x10 c exhale 10x  10x   Prone OAL  10x ea bilat  10x ea bilat      Around the [place] at wall    10x ea bilat  NT   Standing thoracic rotation    10x ea bilat  10x ea bilat    Thread the needle    10x ea bilat  10x ea bilat    Ther Ex        Nustep for ROM/strength  10' L1 10' L3 10' L3 10' L3   Bridge c ABD  2x10 GTB 2x10 GTB  2x10 GTB   Clamshells  2x10 ea bilat GTB 2x10 ea bilat GTB   2x10 ea bilat GTB    PPT        LTR                        HEP education and instruction 15'   5'    Ther Activity                        Gait Training                        Modalities        CP  MHP mid/low back in prone 10' MHP to low back  15' MHP to low back 10' MHP to low back 10'

## 2025-05-22 ENCOUNTER — EVALUATION (OUTPATIENT)
Dept: PHYSICAL THERAPY | Facility: CLINIC | Age: 70
End: 2025-05-22
Payer: COMMERCIAL

## 2025-05-22 DIAGNOSIS — M15.9 GENERALIZED OSTEOARTHROSIS, INVOLVING MULTIPLE SITES: ICD-10-CM

## 2025-05-22 DIAGNOSIS — R07.82 INTERCOSTAL PAIN: Primary | ICD-10-CM

## 2025-05-22 PROCEDURE — 97140 MANUAL THERAPY 1/> REGIONS: CPT

## 2025-05-22 PROCEDURE — 97112 NEUROMUSCULAR REEDUCATION: CPT

## 2025-05-22 PROCEDURE — 97110 THERAPEUTIC EXERCISES: CPT

## 2025-05-22 NOTE — LETTER
May 22, 2025    ALMA Bloom  [address] [facility] [address]    Patient: Jose Hopkins   YOB: 1955   Date of Visit: 2025     Encounter Diagnosis     ICD-10-CM    1. Intercostal pain  R07.82       2. Generalized osteoarthrosis, involving multiple sites  M15.9           Dear ALMA Fleming:    Thank you for your recent referral of Jose Hopkins. Please review the attached re-evaluation summary from Jose's recent visit.     Please verify that you agree with the plan of care by signing the attached order.     If you have any questions or concerns, please do not hesitate to call.     I sincerely appreciate the opportunity to share in the care of one of your patients and hope to have another opportunity to work with you in the near future.       Sincerely,    Sania Cota, PT      Referring Provider:      I certify that I have read the below Plan of Care and certify the need for these services furnished under this plan of treatment while under my care.                    ALMA Bloom  [address] [facility] [address]  Via Fax: [phone]          Daily Note     Today's date: 2025  Patient name: Jose Hopkins  : 1955  MRN: [id number]  Referring provider: Graciela Tellez CR*  Dx:   Encounter Diagnosis     ICD-10-CM    1. Intercostal pain  R07.82       2. Generalized osteoarthrosis, involving multiple sites  M15.9                      Subjective: Patient reports that he is doing okay and is noticing some improvement since starting OPPT.      Objective: See treatment diary below      Assessment: Tolerated treatment well. Patient exhibited good technique with therapeutic exercises and would benefit from continued PT to increase ROM/strength and endurance to improve his overall mobility.      Plan: Continue per plan of care.      Precautions: PMH BPH, Fatty Liver, CPAP  POC Expiration: 25    Manuals 4/28 5/12 5/15 5/19 5/22  "  Thoracic PA mobs  10' c lumbar mobs  15' c STM to R lower t-spine/ribs 15' c STM to R lower t-spine/ribs 15'c STM to R lower tspine/ribs    LLE stretching  10' 15'       SIJ check Negative                Neuro Re-Ed        SL open books  10x ea bilat  10x ea bilat  10x ea bilat  10x ea bilat 10x5\"bilat   Seated rotation c weighted ball 10x bilat soccer ball 10x bilat YWB      Prone scap stabilization   10x  10x    Prone TY   10x ea  10x ea 2x10ea   PPU 2x10  2x10 c exhale 10x  10x 2x10   Prone OAL 10x ea bilat  10x ea bilat       Around the [place] at wall   10x ea bilat  NT    Standing thoracic rotation   10x ea bilat  10x ea bilat     Thread the needle   10x ea bilat  10x ea bilat     Ther Ex        Nustep for ROM/strength 10' L1 10' L3 10' L3 10' L3 10'L4   Bridge c ABD 2x10 GTB 2x10 GTB  2x10 GTB 2x10 GTB   Clamshells 2x10 ea bilat GTB 2x10 ea bilat GTB   2x10 ea bilat GTB  [id number] GTB   PPT        LTR                        HEP education and instruction   5'     Ther Activity                        Gait Training                        Modalities        CP MHP mid/low back in prone 10' MHP to low back 15' MHP to low back 10' MHP to low back 10' 10'MH to LB                         PT Re-Evaluation     Today's date: 2025  Patient name: Jose Hopkins  : 1955  MRN: [id number]  Referring provider: Graciela Tellez CR*  Dx:   Encounter Diagnosis     ICD-10-CM    1. Intercostal pain  R07.82       2. Generalized osteoarthrosis, involving multiple sites  M15.9           Start Time: 1600  Stop Time: 1705  Total time in clinic (min): 65 minutes    Assessment  Impairments: abnormal or restricted ROM, activity intolerance, impaired physical strength, lacks appropriate home exercise program, pain with function, weight-bearing intolerance, participation limitations and activity limitations    Assessment details: Patient has been seen by OP PT for 6 visits and is making good progress towards goals. Patient " is demonstrating improved lumbar mobility and BLE strength with decreased pain in R rib cage with functional movements. Patient continues to have tenderness in R rib cage but it seems to be more localized to rib 11-12. Patient would benefit from continued skilled PT to further reduce functional impairments.     Goals  STG to be met within 4 weeks:  1. Pt will increase BLE strength by 1/2 muscle grade to improve functional mobility and reduce pain. - met  2. Pt will be independent with HEP to decrease pain and improve quality of life. - met  3. Pt will report 3/10 pain at worst to improve functional mobility and quality of life. - met  4. Pt will increase FOTO score by 10 points in order to improve lumbar spine mobility and reduce pain with function. - in progress    LTG to be met within 8 weeks:  1. Pt will improve BLE strength to WNL to improve functional mobility and quality of life. - in progress  2. Pt will restore lumbar spine AROM to WNL to improve functional mobility and quality of life. - in progress  3. Pt will meet FOTO score goal at DC to improve lumbar spine mobility in pain free range. - in progress       Plan  Patient would benefit from: skilled physical therapy  Planned modality interventions: cryotherapy and thermotherapy: hydrocollator packs    Planned therapy interventions: flexibility, functional ROM exercises, home exercise program, joint mobilization, manual therapy, neuromuscular re-education, patient/caregiver education, self care, strengthening, stretching and therapeutic exercise    Frequency: 2x week  Duration in weeks: 4  Treatment plan discussed with: patient  Plan details: Transition to 1-2x per week as needed        Subjective Evaluation    History of Present Illness  Mechanism of injury: Patient reports he feels he has made about 55% improvement since beginning PT. He states he can still reach his higher pain level in his rib cage but feels that he can move more   Patient  Goals  Patient goals for therapy: increased motion, decreased pain, increased strength and return to sport/leisure activities    Pain  Current pain ratin  At best pain ratin  At worst pain rating: 3  Quality: radiating, dull ache and sharp  Relieving factors: ice, heat, medications and support  Aggravating factors: lifting and overhead activity  Progression: improved    Treatments  Previous treatment: chiropractic  Current treatment: physical therapy      Objective     Palpation   Left   Tenderness of the lumbar paraspinals.     Right   Tenderness of the lumbar paraspinals.     Additional Palpation Details  Tenderness along     Neurological Testing     Sensation     Lumbar   Left   Intact: light touch    Right   Intact: light touch    Reflexes   Left   Patellar (L4): normal (2+)  Achilles (S1): normal (2+)    Right   Patellar (L4): normal (2+)  Achilles (S1): normal (2+)    Active Range of Motion     Lumbar   Flexion:  WFL  Extension:  WFL  Left lateral flexion:  Restriction level: minimal  Right lateral flexion:  Restriction level: minimal  Left rotation:  Restriction level: minimal  Right rotation:  Restriction level: minimal    Joint Play   Joints within functional limits: L2, L3, L4 and L5     Hypomobile: T8, T9, T10, T11, T12 and L1     Strength/Myotome Testing     Left Hip   Planes of Motion   Flexion: 4+  Extension: 4  Abduction: 4+  Adduction: 4+  External rotation: 4+  Internal rotation: 4+    Right Hip   Planes of Motion   Flexion: 4+  Extension: 4  Abduction: 4+  Adduction: 4+  External rotation: 4+  Internal rotation: 4+    Tests     Lumbar     Left   Positive passive SLR and slump test.   Negative crossed SLR.     Right   Positive crossed SLR.   Negative passive SLR and slump test.     Left Pelvic Girdle/Sacrum   Negative: active SLR test.     Right Pelvic Girdle/Sacrum   Negative: active SLR test.     Left Hip   Positive STEPHANIE and FADIR.     Right Hip   Negative STEPHANIE and FADIR.               Precautions: PMH BPH, Fatty Liver, CPAP  POC Expiration: 6/19/25

## 2025-05-22 NOTE — PROGRESS NOTES
PT Re-Evaluation     Today's date: 2025  Patient name: Jose Hopkins  : 1955  MRN: [id number]  Referring provider: Graciela Tellez CR*  Dx:   Encounter Diagnosis     ICD-10-CM    1. Intercostal pain  R07.82       2. Generalized osteoarthrosis, involving multiple sites  M15.9           Start Time: 1600  Stop Time: 1705  Total time in clinic (min): 65 minutes    Assessment  Impairments: abnormal or restricted ROM, activity intolerance, impaired physical strength, lacks appropriate home exercise program, pain with function, weight-bearing intolerance, participation limitations and activity limitations    Assessment details: Patient has been seen by OP PT for 6 visits and is making good progress towards goals. Patient is demonstrating improved lumbar mobility and BLE strength with decreased pain in R rib cage with functional movements. Patient continues to have tenderness in R rib cage but it seems to be more localized to rib 11-12. Patient would benefit from continued skilled PT to further reduce functional impairments.     Goals  STG to be met within 4 weeks:  1. Pt will increase BLE strength by 1/2 muscle grade to improve functional mobility and reduce pain. - met  2. Pt will be independent with HEP to decrease pain and improve quality of life. - met  3. Pt will report 3/10 pain at worst to improve functional mobility and quality of life. - met  4. Pt will increase FOTO score by 10 points in order to improve lumbar spine mobility and reduce pain with function. - in progress    LTG to be met within 8 weeks:  1. Pt will improve BLE strength to WNL to improve functional mobility and quality of life. - in progress  2. Pt will restore lumbar spine AROM to WNL to improve functional mobility and quality of life. - in progress  3. Pt will meet FOTO score goal at DC to improve lumbar spine mobility in pain free range. - in progress       Plan  Patient would benefit from: skilled physical therapy  Planned  modality interventions: cryotherapy and thermotherapy: hydrocollator packs    Planned therapy interventions: flexibility, functional ROM exercises, home exercise program, joint mobilization, manual therapy, neuromuscular re-education, patient/caregiver education, self care, strengthening, stretching and therapeutic exercise    Frequency: 2x week  Duration in weeks: 4  Treatment plan discussed with: patient  Plan details: Transition to 1-2x per week as needed        Subjective Evaluation    History of Present Illness  Mechanism of injury: Patient reports he feels he has made about 55% improvement since beginning PT. He states he can still reach his higher pain level in his rib cage but feels that he can move more   Patient Goals  Patient goals for therapy: increased motion, decreased pain, increased strength and return to sport/leisure activities    Pain  Current pain ratin  At best pain ratin  At worst pain rating: 3  Quality: radiating, dull ache and sharp  Relieving factors: ice, heat, medications and support  Aggravating factors: lifting and overhead activity  Progression: improved    Treatments  Previous treatment: chiropractic  Current treatment: physical therapy      Objective     Palpation   Left   Tenderness of the lumbar paraspinals.     Right   Tenderness of the lumbar paraspinals.     Additional Palpation Details  Tenderness along     Neurological Testing     Sensation     Lumbar   Left   Intact: light touch    Right   Intact: light touch    Reflexes   Left   Patellar (L4): normal (2+)  Achilles (S1): normal (2+)    Right   Patellar (L4): normal (2+)  Achilles (S1): normal (2+)    Active Range of Motion     Lumbar   Flexion:  WFL  Extension:  WFL  Left lateral flexion:  Restriction level: minimal  Right lateral flexion:  Restriction level: minimal  Left rotation:  Restriction level: minimal  Right rotation:  Restriction level: minimal    Joint Play   Joints within functional limits: L2, L3, L4 and  L5     Hypomobile: T8, T9, T10, T11, T12 and L1     Strength/Myotome Testing     Left Hip   Planes of Motion   Flexion: 4+  Extension: 4  Abduction: 4+  Adduction: 4+  External rotation: 4+  Internal rotation: 4+    Right Hip   Planes of Motion   Flexion: 4+  Extension: 4  Abduction: 4+  Adduction: 4+  External rotation: 4+  Internal rotation: 4+    Tests     Lumbar     Left   Positive passive SLR and slump test.   Negative crossed SLR.     Right   Positive crossed SLR.   Negative passive SLR and slump test.     Left Pelvic Girdle/Sacrum   Negative: active SLR test.     Right Pelvic Girdle/Sacrum   Negative: active SLR test.     Left Hip   Positive STEPHANIE and FADIR.     Right Hip   Negative STEPHANIE and FADIR.              Precautions: PMH BPH, Fatty Liver, CPAP  POC Expiration: 6/19/25

## 2025-05-22 NOTE — PROGRESS NOTES
"Daily Note     Today's date: 2025  Patient name: Jose Hopkins  : 1955  MRN: [id number]  Referring provider: Graciela Tellez CR*  Dx:   Encounter Diagnosis     ICD-10-CM    1. Intercostal pain  R07.82       2. Generalized osteoarthrosis, involving multiple sites  M15.9                      Subjective: Patient reports that he is doing okay and is noticing some improvement since starting OPPT.      Objective: See treatment diary below      Assessment: Tolerated treatment well. Patient exhibited good technique with therapeutic exercises and would benefit from continued PT to increase ROM/strength and endurance to improve his overall mobility.      Plan: Continue per plan of care.      Precautions: PMH BPH, Fatty Liver, CPAP  POC Expiration: 25    Manuals 4/28 5/12 5/15 5/19 5/22   Thoracic PA mobs  10' c lumbar mobs  15' c STM to R lower t-spine/ribs 15' c STM to R lower t-spine/ribs 15'c STM to R lower tspine/ribs    LLE stretching  10' 15'       SIJ check Negative                Neuro Re-Ed        SL open books  10x ea bilat  10x ea bilat  10x ea bilat  10x ea bilat 10x5\"bilat   Seated rotation c weighted ball 10x bilat soccer ball 10x bilat YWB      Prone scap stabilization   10x  10x    Prone TY   10x ea  10x ea 2x10ea   PPU 2x10  2x10 c exhale 10x  10x 2x10   Prone OAL 10x ea bilat  10x ea bilat       Around the world at wall   10x ea bilat  NT    Standing thoracic rotation   10x ea bilat  10x ea bilat     Thread the needle   10x ea bilat  10x ea bilat     Ther Ex        Nustep for ROM/strength 10' L1 10' L3 10' L3 10' L3 10'L4   Bridge c ABD 2x10 GTB 2x10 GTB  2x10 GTB 2x10 GTB   Clamshells 2x10 ea bilat GTB 2x10 ea bilat GTB   2x10 ea bilat GTB  [id number] GTB   PPT        LTR                        HEP education and instruction   5'     Ther Activity                        Gait Training                        Modalities        CP MHP mid/low back in prone 10' MHP to low back 15' MHP to " low back 10' MHP to low back 10' 10'MH to LB

## 2025-05-28 ENCOUNTER — OFFICE VISIT (OUTPATIENT)
Dept: ENDOCRINOLOGY | Facility: CLINIC | Age: 70
End: 2025-05-28
Payer: COMMERCIAL

## 2025-05-28 VITALS
WEIGHT: 247 LBS | BODY MASS INDEX: 35.36 KG/M2 | DIASTOLIC BLOOD PRESSURE: 88 MMHG | TEMPERATURE: 97 F | OXYGEN SATURATION: 96 % | HEIGHT: 70 IN | HEART RATE: 86 BPM | SYSTOLIC BLOOD PRESSURE: 130 MMHG

## 2025-05-28 DIAGNOSIS — E23.0 HYPOGONADOTROPIC HYPOGONADISM IN MALE (HCC): Primary | ICD-10-CM

## 2025-05-28 DIAGNOSIS — R39.11 URINARY HESITANCY: ICD-10-CM

## 2025-05-28 DIAGNOSIS — I10 PRIMARY HYPERTENSION: ICD-10-CM

## 2025-05-28 DIAGNOSIS — Z86.2 HISTORY OF POLYCYTHEMIA: ICD-10-CM

## 2025-05-28 PROCEDURE — G2211 COMPLEX E/M VISIT ADD ON: HCPCS | Performed by: STUDENT IN AN ORGANIZED HEALTH CARE EDUCATION/TRAINING PROGRAM

## 2025-05-28 PROCEDURE — 99214 OFFICE O/P EST MOD 30 MIN: CPT | Performed by: STUDENT IN AN ORGANIZED HEALTH CARE EDUCATION/TRAINING PROGRAM

## 2025-05-28 NOTE — PROGRESS NOTES
Name: Jose Hopkins      : 1955      MRN: [id number]  Encounter Provider: Rivera Larsen DO  Encounter Date: 2025   Encounter department: [facility]    No chief complaint on file.  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 PSA levels have remained stable, with no significant changes observed. Continued regimen of AndroGel, applying one pump daily and alternating between shoulders. Follow-up labs will be conducted in 3 months to monitor response to treatment.    Follow-up: The patient will follow up in 6 months.    Orders:  •  Comprehensive metabolic panel; Standing  •  Testosterone, free, total; Standing  •  CBC and differential; Standing    Primary hypertension  Good control       History of polycythemia    Orders:  •  CBC and differential; Standing    Urinary hesitancy  Reports increased urinary hesitancy, which may be related to testosterone therapy. Recent PSA level was 0.505, indicating no significant change. Recommended urologic examination to further evaluate symptoms. Patient plans to make an appointment with a urologist.             Pertinent Medical History         History of Present Illness   History of Present Illness  The patient is a 69-year-old male here today for a follow-up of hypogonadotropic hypogonadism. The primary reason for this visit is to discuss his current AndroGel therapy and related symptoms.    His last visit was on 02/10/2025, at which time he had been applying AndroGel at 40 mg daily, which is two pump actuations. This was an increase from his prior dosing. Following that change, he noted side effects such as changes in blood pressure and increased urinary hesitancy. His labs also showed worsening of his hematocrit, elevated to 52.7. He subsequently reduced his dosing to one pump daily and has  "follow-up labs available.    Currently, he has been self-administering a half pump of AndroGel, distributed between both shoulders, and has never exceeded a full pump dosage. Initially, he was using a full pump shared between both shoulders but decided to reduce the dosage due to some concerning blood work results. He is considering increasing the dosage to three-quarters of a pump. He reports a slight decrease in his testosterone levels since the last visit and expresses concern about his elevated red blood cell count. Despite these concerns, he feels well overall. He describes the AndroGel as oily and notes that a significant portion adheres to his fingers during application, which he believes may reduce the amount absorbed by his skin. He does not require any prescription refills at this time.    He plans to schedule an appointment with a urologist due to persistent frequent urination.    Review of Systems as per HPI       Medical History Reviewed by provider this encounter:  Tobacco  Allergies  Meds  Problems  Med Hx  Surg Hx  Fam Hx     .    Objective   /88 (BP Location: Left arm, Patient Position: Sitting)   Pulse 86   Temp (!) 97 °F (36.1 °C)   Ht 5' 10\" (1.778 m)   Wt 112 kg (247 lb)   SpO2 96%   BMI 35.44 kg/m²      Body mass index is 35.44 kg/m².  Wt Readings from Last 3 Encounters:   05/28/25 112 kg (247 lb)   02/10/25 112 kg (247 lb)   11/07/24 112 kg (248 lb)     Physical Exam  Vitals reviewed.   Constitutional:       General: He is not in acute distress.     Appearance: Normal appearance.   HENT:      Head: Normocephalic and atraumatic.      Nose: Nose normal.     Eyes:      General: No scleral icterus.     Conjunctiva/sclera: Conjunctivae normal.     Pulmonary:      Effort: Pulmonary effort is normal. No respiratory distress.     Musculoskeletal:         General: No deformity.      Cervical back: Normal range of motion.     Skin:     General: Skin is warm and dry.     Neurological: "      Mental Status: He is alert.     Psychiatric:         Mood and Affect: Mood normal.         Behavior: Behavior normal.       Physical Exam      Results    Component      Latest Ref Rng 2/7/2025 5/12/2025   WBC      4.31 - 10.16 Thousand/uL  7.76    RBC      3.88 - 5.62 Million/uL  5.60    Hemoglobin      12.0 - 17.0 g/dL  16.4    Hematocrit      36.5 - 49.3 %  50.6 (H)    MCV      82 - 98 fL  90    MCH      26.8 - 34.3 pg  29.3    MCHC      31.4 - 37.4 g/dL  32.4    RDW      11.6 - 15.1 %  12.7    MPV      8.9 - 12.7 fL  10.6    Platelet Count      149 - 390 Thousands/uL  203    nRBC      /100 WBCs  0    Segmented %      43 - 75 %  59    Immature Grans %      0 - 2 %  0    Lymphocytes %      14 - 44 %  23    Monocytes %      4 - 12 %  11    Eosinophils %      0 - 6 %  6    Basophils %      0 - 1 %  1    Absolute Neutrophils      1.85 - 7.62 Thousands/µL  4.55    Absolute Immature Grans      0.00 - 0.20 Thousand/uL  0.03    Absolute Lymphocytes      0.60 - 4.47 Thousands/µL  1.75    Absolute Monocytes      0.17 - 1.22 Thousand/µL  0.84    Absolute Eosinophils      0.00 - 0.61 Thousand/µL  0.50    Absolute Basophils      0.00 - 0.10 Thousands/µL  0.09    Sodium      135 - 147 mmol/L  139    Potassium      3.5 - 5.3 mmol/L  4.1    Chloride      96 - 108 mmol/L  104    Carbon Dioxide      21 - 32 mmol/L  30    ANION GAP      4 - 13 mmol/L  5    BUN      5 - 25 mg/dL  22    Creatinine      0.60 - 1.30 mg/dL  1.38 (H)    GLUCOSE, FASTING      65 - 99 mg/dL  90    Calcium      8.4 - 10.2 mg/dL  8.9    AST      13 - 39 U/L  23    ALT      7 - 52 U/L  31    ALK PHOS      34 - 104 U/L  91    Total Protein      6.4 - 8.4 g/dL  6.6    Albumin      3.5 - 5.0 g/dL  3.9    Total Bilirubin      0.20 - 1.00 mg/dL  0.56    GFR, Calculated      ml/min/1.73sq m  51    TESTOSTERONE FREE      6.6 - 18.1 pg/mL 6.8  4.2 (L)    Testosterone, Total, LC/MS      264 - 916 ng/dL 339  256 (L)    PSA      0.000 - 4.000 ng/mL 0.505       "  Legend:  (H) High  (L) Low    No results found for: \"TESTOSTERONE\"    Labs: I have reviewed pertinent labs including:     There are no Patient Instructions on file for this visit.    Discussed with the patient and all questioned fully answered. He will call me if any problems arise.      "

## 2025-05-28 NOTE — ASSESSMENT & PLAN NOTE
Hematocrit levels have shown a decrease, currently at 50.6, which is within acceptable range. PSA levels have remained stable, with no significant changes observed. Continued regimen of AndroGel, applying one pump daily and alternating between shoulders. Follow-up labs will be conducted in 3 months to monitor response to treatment.    Follow-up: The patient will follow up in 6 months.    Orders:  •  Comprehensive metabolic panel; Standing  •  Testosterone, free, total; Standing  •  CBC and differential; Standing

## 2025-05-29 ENCOUNTER — APPOINTMENT (OUTPATIENT)
Dept: PHYSICAL THERAPY | Facility: CLINIC | Age: 70
End: 2025-05-29
Payer: COMMERCIAL

## 2025-06-25 ENCOUNTER — TELEPHONE (OUTPATIENT)
Age: 70
End: 2025-06-25

## 2025-06-25 NOTE — TELEPHONE ENCOUNTER
Pharmacy called to verify Cialis script belong to patient and  was not listed.    Verified and advised that Cialis was his.     No further assistance was needed at this time